# Patient Record
Sex: FEMALE | Race: WHITE | NOT HISPANIC OR LATINO | Employment: UNEMPLOYED | URBAN - METROPOLITAN AREA
[De-identification: names, ages, dates, MRNs, and addresses within clinical notes are randomized per-mention and may not be internally consistent; named-entity substitution may affect disease eponyms.]

---

## 2019-11-10 ENCOUNTER — HOSPITAL ENCOUNTER (EMERGENCY)
Facility: HOSPITAL | Age: 29
Discharge: HOME | End: 2019-11-10
Attending: EMERGENCY MEDICINE | Admitting: EMERGENCY MEDICINE

## 2019-11-10 ENCOUNTER — APPOINTMENT (OUTPATIENT)
Dept: CT IMAGING | Facility: HOSPITAL | Age: 29
End: 2019-11-10

## 2019-11-10 ENCOUNTER — APPOINTMENT (OUTPATIENT)
Dept: GENERAL RADIOLOGY | Facility: HOSPITAL | Age: 29
End: 2019-11-10

## 2019-11-10 VITALS
HEART RATE: 82 BPM | SYSTOLIC BLOOD PRESSURE: 91 MMHG | WEIGHT: 114 LBS | BODY MASS INDEX: 19.46 KG/M2 | HEIGHT: 64 IN | OXYGEN SATURATION: 95 % | DIASTOLIC BLOOD PRESSURE: 56 MMHG | RESPIRATION RATE: 16 BRPM | TEMPERATURE: 98.2 F

## 2019-11-10 DIAGNOSIS — R10.9 BILATERAL FLANK PAIN: ICD-10-CM

## 2019-11-10 DIAGNOSIS — N39.0 URINARY TRACT INFECTION IN FEMALE: Primary | ICD-10-CM

## 2019-11-10 LAB
ALBUMIN SERPL-MCNC: 4.4 G/DL (ref 3.5–5.2)
ALBUMIN/GLOB SERPL: 1.8 G/DL
ALP SERPL-CCNC: 78 U/L (ref 39–117)
ALT SERPL W P-5'-P-CCNC: 13 U/L (ref 1–33)
ANION GAP SERPL CALCULATED.3IONS-SCNC: 12.3 MMOL/L (ref 5–15)
AST SERPL-CCNC: 25 U/L (ref 1–32)
B-HCG UR QL: NEGATIVE
BACTERIA UR QL AUTO: ABNORMAL /HPF
BASOPHILS # BLD AUTO: 0.01 10*3/MM3 (ref 0–0.2)
BASOPHILS NFR BLD AUTO: 0.2 % (ref 0–1.5)
BILIRUB SERPL-MCNC: 0.2 MG/DL (ref 0.2–1.2)
BILIRUB UR QL STRIP: ABNORMAL
BUN BLD-MCNC: 10 MG/DL (ref 6–20)
BUN/CREAT SERPL: 10.1 (ref 7–25)
CALCIUM SPEC-SCNC: 9.2 MG/DL (ref 8.6–10.5)
CHLORIDE SERPL-SCNC: 98 MMOL/L (ref 98–107)
CLARITY UR: CLEAR
CO2 SERPL-SCNC: 23.7 MMOL/L (ref 22–29)
COLOR UR: YELLOW
CREAT BLD-MCNC: 0.99 MG/DL (ref 0.57–1)
D-LACTATE SERPL-SCNC: 1.1 MMOL/L (ref 0.5–2)
DEPRECATED RDW RBC AUTO: 43 FL (ref 37–54)
EOSINOPHIL # BLD AUTO: 0.31 10*3/MM3 (ref 0–0.4)
EOSINOPHIL NFR BLD AUTO: 7.1 % (ref 0.3–6.2)
ERYTHROCYTE [DISTWIDTH] IN BLOOD BY AUTOMATED COUNT: 13 % (ref 12.3–15.4)
FLUAV AG NPH QL: NEGATIVE
FLUBV AG NPH QL IA: NEGATIVE
GFR SERPL CREATININE-BSD FRML MDRD: 66 ML/MIN/1.73
GLOBULIN UR ELPH-MCNC: 2.5 GM/DL
GLUCOSE BLD-MCNC: 102 MG/DL (ref 65–99)
GLUCOSE UR STRIP-MCNC: NEGATIVE MG/DL
HCT VFR BLD AUTO: 39.5 % (ref 34–46.6)
HGB BLD-MCNC: 13.1 G/DL (ref 12–15.9)
HGB UR QL STRIP.AUTO: NEGATIVE
HYALINE CASTS UR QL AUTO: ABNORMAL /LPF
IMM GRANULOCYTES # BLD AUTO: 0.03 10*3/MM3 (ref 0–0.05)
IMM GRANULOCYTES NFR BLD AUTO: 0.7 % (ref 0–0.5)
KETONES UR QL STRIP: ABNORMAL
LEUKOCYTE ESTERASE UR QL STRIP.AUTO: NEGATIVE
LYMPHOCYTES # BLD AUTO: 0.49 10*3/MM3 (ref 0.7–3.1)
LYMPHOCYTES NFR BLD AUTO: 11.2 % (ref 19.6–45.3)
MCH RBC QN AUTO: 29.8 PG (ref 26.6–33)
MCHC RBC AUTO-ENTMCNC: 33.2 G/DL (ref 31.5–35.7)
MCV RBC AUTO: 89.8 FL (ref 79–97)
MONOCYTES # BLD AUTO: 0.49 10*3/MM3 (ref 0.1–0.9)
MONOCYTES NFR BLD AUTO: 11.2 % (ref 5–12)
NEUTROPHILS # BLD AUTO: 3.05 10*3/MM3 (ref 1.7–7)
NEUTROPHILS NFR BLD AUTO: 69.6 % (ref 42.7–76)
NITRITE UR QL STRIP: NEGATIVE
NRBC BLD AUTO-RTO: 0 /100 WBC (ref 0–0.2)
PH UR STRIP.AUTO: 6.5 [PH] (ref 4.5–8)
PLATELET # BLD AUTO: 230 10*3/MM3 (ref 140–450)
PMV BLD AUTO: 9.3 FL (ref 6–12)
POTASSIUM BLD-SCNC: 4.3 MMOL/L (ref 3.5–5.2)
PROT SERPL-MCNC: 6.9 G/DL (ref 6–8.5)
PROT UR QL STRIP: ABNORMAL
RBC # BLD AUTO: 4.4 10*6/MM3 (ref 3.77–5.28)
RBC # UR: ABNORMAL /HPF
REF LAB TEST METHOD: ABNORMAL
SODIUM BLD-SCNC: 134 MMOL/L (ref 136–145)
SP GR UR STRIP: 1.03 (ref 1–1.03)
SQUAMOUS #/AREA URNS HPF: ABNORMAL /HPF
UROBILINOGEN UR QL STRIP: ABNORMAL
WBC NRBC COR # BLD: 4.38 10*3/MM3 (ref 3.4–10.8)
WBC UR QL AUTO: ABNORMAL /HPF

## 2019-11-10 PROCEDURE — 99284 EMERGENCY DEPT VISIT MOD MDM: CPT | Performed by: PHYSICIAN ASSISTANT

## 2019-11-10 PROCEDURE — 99283 EMERGENCY DEPT VISIT LOW MDM: CPT

## 2019-11-10 PROCEDURE — 85025 COMPLETE CBC W/AUTO DIFF WBC: CPT | Performed by: PHYSICIAN ASSISTANT

## 2019-11-10 PROCEDURE — 87086 URINE CULTURE/COLONY COUNT: CPT | Performed by: PHYSICIAN ASSISTANT

## 2019-11-10 PROCEDURE — 87040 BLOOD CULTURE FOR BACTERIA: CPT | Performed by: PHYSICIAN ASSISTANT

## 2019-11-10 PROCEDURE — 96365 THER/PROPH/DIAG IV INF INIT: CPT

## 2019-11-10 PROCEDURE — 71046 X-RAY EXAM CHEST 2 VIEWS: CPT

## 2019-11-10 PROCEDURE — 83605 ASSAY OF LACTIC ACID: CPT | Performed by: PHYSICIAN ASSISTANT

## 2019-11-10 PROCEDURE — 87491 CHLMYD TRACH DNA AMP PROBE: CPT | Performed by: PHYSICIAN ASSISTANT

## 2019-11-10 PROCEDURE — 87591 N.GONORRHOEAE DNA AMP PROB: CPT | Performed by: PHYSICIAN ASSISTANT

## 2019-11-10 PROCEDURE — 0 IOPAMIDOL PER 1 ML: Performed by: EMERGENCY MEDICINE

## 2019-11-10 PROCEDURE — 36415 COLL VENOUS BLD VENIPUNCTURE: CPT

## 2019-11-10 PROCEDURE — 74177 CT ABD & PELVIS W/CONTRAST: CPT

## 2019-11-10 PROCEDURE — 81025 URINE PREGNANCY TEST: CPT | Performed by: PHYSICIAN ASSISTANT

## 2019-11-10 PROCEDURE — 87804 INFLUENZA ASSAY W/OPTIC: CPT | Performed by: PHYSICIAN ASSISTANT

## 2019-11-10 PROCEDURE — 25010000002 KETOROLAC TROMETHAMINE PER 15 MG: Performed by: PHYSICIAN ASSISTANT

## 2019-11-10 PROCEDURE — 80053 COMPREHEN METABOLIC PANEL: CPT | Performed by: PHYSICIAN ASSISTANT

## 2019-11-10 PROCEDURE — 96361 HYDRATE IV INFUSION ADD-ON: CPT

## 2019-11-10 PROCEDURE — 96375 TX/PRO/DX INJ NEW DRUG ADDON: CPT

## 2019-11-10 PROCEDURE — 25010000002 CEFTRIAXONE SODIUM-DEXTROSE 1-3.74 GM-%(50ML) RECONSTITUTED SOLUTION: Performed by: PHYSICIAN ASSISTANT

## 2019-11-10 PROCEDURE — 81001 URINALYSIS AUTO W/SCOPE: CPT | Performed by: PHYSICIAN ASSISTANT

## 2019-11-10 RX ORDER — IBUPROFEN 400 MG/1
400 TABLET ORAL EVERY 6 HOURS PRN
COMMUNITY
End: 2021-01-04

## 2019-11-10 RX ORDER — PAROXETINE 30 MG/1
30 TABLET, FILM COATED ORAL EVERY MORNING
COMMUNITY
End: 2020-12-29

## 2019-11-10 RX ORDER — CEFUROXIME AXETIL 500 MG/1
500 TABLET ORAL 2 TIMES DAILY
Qty: 14 TABLET | Refills: 0 | Status: SHIPPED | OUTPATIENT
Start: 2019-11-10 | End: 2019-11-17

## 2019-11-10 RX ORDER — ACETAMINOPHEN 500 MG
1000 TABLET ORAL ONCE
Status: COMPLETED | OUTPATIENT
Start: 2019-11-10 | End: 2019-11-10

## 2019-11-10 RX ORDER — KETOROLAC TROMETHAMINE 30 MG/ML
30 INJECTION, SOLUTION INTRAMUSCULAR; INTRAVENOUS ONCE
Status: COMPLETED | OUTPATIENT
Start: 2019-11-10 | End: 2019-11-10

## 2019-11-10 RX ORDER — CEFTRIAXONE 1 G/50ML
1 INJECTION, SOLUTION INTRAVENOUS ONCE
Status: COMPLETED | OUTPATIENT
Start: 2019-11-10 | End: 2019-11-10

## 2019-11-10 RX ORDER — DEXTROAMPHETAMINE SACCHARATE, AMPHETAMINE ASPARTATE, DEXTROAMPHETAMINE SULFATE AND AMPHETAMINE SULFATE 5; 5; 5; 5 MG/1; MG/1; MG/1; MG/1
40 TABLET ORAL DAILY
COMMUNITY
End: 2021-01-04

## 2019-11-10 RX ORDER — SULFAMETHOXAZOLE AND TRIMETHOPRIM 800; 160 MG/1; MG/1
1 TABLET ORAL 2 TIMES DAILY
COMMUNITY
End: 2019-11-10

## 2019-11-10 RX ORDER — AZITHROMYCIN 250 MG/1
1000 TABLET, FILM COATED ORAL ONCE
Status: COMPLETED | OUTPATIENT
Start: 2019-11-10 | End: 2019-11-10

## 2019-11-10 RX ORDER — SODIUM CHLORIDE 9 MG/ML
INJECTION, SOLUTION INTRAVENOUS
Status: DISCONTINUED
Start: 2019-11-10 | End: 2019-11-10 | Stop reason: HOSPADM

## 2019-11-10 RX ADMIN — CEFTRIAXONE 1 G: 1 INJECTION, SOLUTION INTRAVENOUS at 14:26

## 2019-11-10 RX ADMIN — ACETAMINOPHEN 1000 MG: 500 TABLET, FILM COATED ORAL at 13:08

## 2019-11-10 RX ADMIN — KETOROLAC TROMETHAMINE 30 MG: 30 INJECTION, SOLUTION INTRAMUSCULAR; INTRAVENOUS at 14:20

## 2019-11-10 RX ADMIN — SODIUM CHLORIDE 1000 ML: 9 INJECTION, SOLUTION INTRAVENOUS at 13:05

## 2019-11-10 RX ADMIN — IOPAMIDOL 100 ML: 755 INJECTION, SOLUTION INTRAVENOUS at 14:06

## 2019-11-10 RX ADMIN — AZITHROMYCIN 1000 MG: 250 TABLET, FILM COATED ORAL at 15:20

## 2019-11-10 NOTE — ED PROVIDER NOTES
" EMERGENCY DEPARTMENT ENCOUNTER      Room Number: 6/06    History is provided by the patient, no translation services needed    HPI:    Chief complaint: Body aches, flank pain, abdominal pain, dysuria    Location: Generalized body aches, bilateral flank pain, and right-sided abdominal pain    Quality/Severity: Moderate to severe, \"feels like I have been hit by a truck\"    Timing/Duration: Patient states she began having body aches a week ago, diagnosed with UTI 5 days ago, started on Bactrim, chills and bilateral flank pain began yesterday, pain in right lower quadrant of abdomen today.    Modifying Factors: Took Motrin hour prior to arrival today    Associated Symptoms: Positive for body aches, flank pain, abdominal pain, dysuria.  Denies any fevers, vomiting, diarrhea, syncope, hematuria, vaginal discharge, or vaginal bleeding.    Narrative: Pt is a 29 y.o. female who presents complaining of the above complaints.  She states she was diagnosed with a UTI at urgent care 5 days ago, has been on Bactrim since, she states she has been having chills and body aches, as well as flank pain and abdominal pain that began yesterday.  Patient reports she is healthy with no past medical history.  She states she is also still having dysuria, and difficulty with urination.  She also reports she becomes dizzy and short of air when she gets up to ambulate.  She denies any cough.      PMD: Provider, No Known    REVIEW OF SYSTEMS  Review of Systems   Constitutional: Positive for chills and fatigue. Negative for appetite change and fever.   Respiratory: Positive for shortness of breath. Negative for cough.    Cardiovascular: Negative for chest pain and palpitations.   Gastrointestinal: Positive for abdominal pain and nausea. Negative for constipation, diarrhea and vomiting.   Genitourinary: Positive for dysuria, flank pain and urgency. Negative for vaginal discharge and vaginal pain.   Musculoskeletal: Positive for myalgias. Negative " for arthralgias, neck pain and neck stiffness.   Skin: Negative for rash and wound.   Neurological: Positive for dizziness. Negative for syncope and headaches.   Psychiatric/Behavioral: Negative for confusion. The patient is not nervous/anxious.          PAST MEDICAL HISTORY  Active Ambulatory Problems     Diagnosis Date Noted   • No Active Ambulatory Problems     Resolved Ambulatory Problems     Diagnosis Date Noted   • No Resolved Ambulatory Problems     Past Medical History:   Diagnosis Date   • ADD (attention deficit disorder)    • Anxiety    • UTI (urinary tract infection)        PAST SURGICAL HISTORY  Past Surgical History:   Procedure Laterality Date   • BREAST SURGERY      IMPLANTS       FAMILY HISTORY  History reviewed. No pertinent family history.    SOCIAL HISTORY  Social History     Socioeconomic History   • Marital status: Single     Spouse name: Not on file   • Number of children: Not on file   • Years of education: Not on file   • Highest education level: Not on file   Tobacco Use   • Smoking status: Never Smoker   Substance and Sexual Activity   • Alcohol use: No     Frequency: Never   • Drug use: No   • Sexual activity: Yes     Partners: Male     Birth control/protection: Condom       ALLERGIES  Lyrica [pregabalin]      Current Facility-Administered Medications:   •  sodium chloride 0.9 % infusion  - ADS Override Pull, , , ,     Current Outpatient Medications:   •  amphetamine-dextroamphetamine (ADDERALL) 20 MG tablet, Take 40 mg by mouth Daily., Disp: , Rfl:   •  ibuprofen (ADVIL,MOTRIN) 400 MG tablet, Take 400 mg by mouth Every 6 (Six) Hours As Needed for Mild Pain ., Disp: , Rfl:   •  PARoxetine (PAXIL) 30 MG tablet, Take 30 mg by mouth Every Morning., Disp: , Rfl:   •  cefuroxime (CEFTIN) 500 MG tablet, Take 1 tablet by mouth 2 (Two) Times a Day for 7 days., Disp: 14 tablet, Rfl: 0    PHYSICAL EXAM  ED Triage Vitals [11/10/19 1222]   Temp Heart Rate Resp BP SpO2   99 °F (37.2 °C) 92 16 106/63 93  %      Temp src Heart Rate Source Patient Position BP Location FiO2 (%)   -- -- -- -- --       Physical Exam   Constitutional: She is oriented to person, place, and time and well-developed, well-nourished, and in no distress. She has a sickly appearance.   Cardiovascular: Normal rate, regular rhythm, normal heart sounds and intact distal pulses.   Pulmonary/Chest: Effort normal and breath sounds normal.   Abdominal: Soft. Bowel sounds are normal. There is tenderness. There is guarding and CVA tenderness (Bilateral). There is no rigidity and no rebound.   Musculoskeletal: Normal range of motion. She exhibits no edema.   Neurological: She is alert and oriented to person, place, and time.   Psychiatric: Memory, affect and judgment normal. Her mood appears anxious.         LAB RESULTS  Results for orders placed or performed during the hospital encounter of 11/10/19   Influenza Antigen, Rapid - Swab, Nasopharynx   Result Value Ref Range    Influenza A Ag, EIA Negative Negative    Influenza B Ag, EIA Negative Negative   Comprehensive Metabolic Panel   Result Value Ref Range    Glucose 102 (H) 65 - 99 mg/dL    BUN 10 6 - 20 mg/dL    Creatinine 0.99 0.57 - 1.00 mg/dL    Sodium 134 (L) 136 - 145 mmol/L    Potassium 4.3 3.5 - 5.2 mmol/L    Chloride 98 98 - 107 mmol/L    CO2 23.7 22.0 - 29.0 mmol/L    Calcium 9.2 8.6 - 10.5 mg/dL    Total Protein 6.9 6.0 - 8.5 g/dL    Albumin 4.40 3.50 - 5.20 g/dL    ALT (SGPT) 13 1 - 33 U/L    AST (SGOT) 25 1 - 32 U/L    Alkaline Phosphatase 78 39 - 117 U/L    Total Bilirubin 0.2 0.2 - 1.2 mg/dL    eGFR Non African Amer 66 >60 mL/min/1.73    Globulin 2.5 gm/dL    A/G Ratio 1.8 g/dL    BUN/Creatinine Ratio 10.1 7.0 - 25.0    Anion Gap 12.3 5.0 - 15.0 mmol/L   Urinalysis With Microscopic If Indicated (No Culture) - Urine, Clean Catch   Result Value Ref Range    Color, UA Yellow Yellow, Straw    Appearance, UA Clear Clear    pH, UA 6.5 4.5 - 8.0    Specific Gravity, UA 1.026 1.003 - 1.030     Glucose, UA Negative Negative    Ketones, UA 40 mg/dL (2+) (A) Negative    Bilirubin, UA Small (1+) (A) Negative    Blood, UA Negative Negative    Protein, UA 30 mg/dL (1+) (A) Negative    Leuk Esterase, UA Negative Negative    Nitrite, UA Negative Negative    Urobilinogen, UA 1.0 E.U./dL 0.2 - 1.0 E.U./dL   Pregnancy, Urine - Urine, Clean Catch   Result Value Ref Range    HCG, Urine QL Negative Negative   CBC Auto Differential   Result Value Ref Range    WBC 4.38 3.40 - 10.80 10*3/mm3    RBC 4.40 3.77 - 5.28 10*6/mm3    Hemoglobin 13.1 12.0 - 15.9 g/dL    Hematocrit 39.5 34.0 - 46.6 %    MCV 89.8 79.0 - 97.0 fL    MCH 29.8 26.6 - 33.0 pg    MCHC 33.2 31.5 - 35.7 g/dL    RDW 13.0 12.3 - 15.4 %    RDW-SD 43.0 37.0 - 54.0 fl    MPV 9.3 6.0 - 12.0 fL    Platelets 230 140 - 450 10*3/mm3    Neutrophil % 69.6 42.7 - 76.0 %    Lymphocyte % 11.2 (L) 19.6 - 45.3 %    Monocyte % 11.2 5.0 - 12.0 %    Eosinophil % 7.1 (H) 0.3 - 6.2 %    Basophil % 0.2 0.0 - 1.5 %    Immature Grans % 0.7 (H) 0.0 - 0.5 %    Neutrophils, Absolute 3.05 1.70 - 7.00 10*3/mm3    Lymphocytes, Absolute 0.49 (L) 0.70 - 3.10 10*3/mm3    Monocytes, Absolute 0.49 0.10 - 0.90 10*3/mm3    Eosinophils, Absolute 0.31 0.00 - 0.40 10*3/mm3    Basophils, Absolute 0.01 0.00 - 0.20 10*3/mm3    Immature Grans, Absolute 0.03 0.00 - 0.05 10*3/mm3    nRBC 0.0 0.0 - 0.2 /100 WBC   Lactic Acid, Plasma   Result Value Ref Range    Lactate 1.1 0.5 - 2.0 mmol/L   Urinalysis, Microscopic Only - Urine, Clean Catch   Result Value Ref Range    RBC, UA 0-2 (A) None Seen /HPF    WBC, UA 3-5 (A) None Seen /HPF    Bacteria, UA 3+ (A) None Seen /HPF    Squamous Epithelial Cells, UA 7-12 (A) None Seen, 0-2 /HPF    Hyaline Casts, UA None Seen None Seen /LPF    Methodology Manual Light Microscopy          I ordered the above labs and reviewed the results    RADIOLOGY  Xr Chest 2 View    Result Date: 11/10/2019  Narrative: CR Chest 2 Vws INDICATION:  Exertional dyspnea chills body aches  COMPARISON:  Chest x-ray January 25th 2013 FINDINGS: PA and lateral views of the chest.  No pleural effusion. Cardiac silhouette size is normal. No acute-appearing parenchymal infiltrate or acute congestive failure. No pneumothorax. Contrast is seen in the left renal collecting system from earlier CT scan.     Impression: No acute cardiopulmonary findings. Signer Name: Angie Villarreal MD  Signed: 11/10/2019 2:06 PM  Workstation Name: [email or web address]  [facility]    Ct Abdomen Pelvis With Contrast    Result Date: 11/10/2019  Narrative: INDICATION: Bilateral flank pain down the right lower quadrant with guarding. Nausea. 6 for 8 days worsening yesterday and today. TECHNIQUE: CT of the abdomen and pelvis during the intravenous administration nonionic contrast media. Dose recorded in the patient's chart contrast. Coronal and sagittal reconstructions were obtained.  Radiation dose reduction techniques included automated exposure control or exposure modulation based on body size. Radiation audit for number of CT and nuclear cardiology exams performed in the last year: 0.  COMPARISON: None available. FINDINGS: Lung bases: Clear Abdomen: The liver, gallbladder, spleen, kidneys, adrenal glands, and pancreas are normal. There is no abdominal aortic aneurysm. There is no retroperitoneal lymphadenopathy. There is a moderate amount of colonic stool to the level of the rectum. I cannot identify a normal-appearing appendix. Cannot exclude the diagnosis of appendicitis. There is a small amount of free fluid dependently in the pelvis which could be physiologic in the patient's age group and there are a few prominent loops of small bowel with air-fluid levels in the pelvis which are very nonspecific. There is nothing to suggest bowel obstruction. Pelvis: There is no free air. There is no percutaneously drainable fluid collection appreciated. The lack of oral contrast media does somewhat limit this study and the  relatively thin patient. Uterus is unremarkable. The adnexal structures are difficult to differentiate between the adjacent unopacified bowel loops.     Impression: 1. There is no evidence for bowel obstruction. There is a moderate amount of colonic gas and stool to the level the rectum. I cannot identify the appendix. This study therefore does not exclude appendicitis. 2. Small amount of free fluid in the pelvis is likely physiologic given patient age group. Signer Name: Angie Villarreal MD  Signed: 11/10/2019 2:17 PM  Workstation Name: [email or web address]  [facility]      I ordered the above radiologic testing and reviewed the results    PROCEDURES  Procedures      PROGRESS AND CONSULTS  ED Course as of Nov 10 1631   Sun Nov 10, 2019   1245 Upon initial assessment, patient does appear ill, her heart rate increased to 124 when she sat during physical exam, we will proceed with septic work-up and start fluids.  [KS]   1444 Discussed case with FEDE Medellin at [facility] where patient was treated for UTI 5 days ago. aLcie states patient had no growth on her urine culture.  [KS]   1504 Patient received 1 g of Rocephin IV here, as well as a 1 L bolus, 1 g of Tylenol, and 30 mg of Toradol IV.  I discussed that we will repeat her urine culture, and change her antibiotics from Bactrim to Ceftin.  [KS]   1505 Patient states her pain is minimal now after Toradol.  I discussed the results with her CT with her, and that we could not definitively rule out appendicitis, however with her pain improving after Toradol and normal lab work I have little concern clinically for appendicitis at this time.  I discussed with patient that I spoke with her provider from the [facility], and they informed me that there was no growth in her urine culture.  She does still have a significant amount of bacteria present in her urine, however there are only a few white blood cells present.  I inquired given the  negative growth, and bacteriuria if she had any suspicion for STDs whatsoever.  Patient states she has never been tested for STDs, however she is agreeable with receiving a dose of azithromycin here, and aptima testing.  Patient declines a pelvic exam.  She denies any vaginal discharge, I discussed with her that given the pain in her lower abdomen, this could be PID, however if I am unable to perform a pelvic exam it is difficult to establish this diagnosis.  Patient reports she is feeling better overall, and I have discussed that she may alternate Tylenol and Motrin for chills and body aches.  Her vitals have improved, and her heart rate is 82.  She states that her systolic blood pressure is typically low in the 90s.  I have provided her with follow-up information for OB/GYN, as well as primary care providers in the area.  I discussed that it is imperative that she follow-up, and she will be notified of any abnormal results with further testing.  I discussed if she has any worsening signs or symptoms she should return to the emergency department.  Patient is agreeable with plan and verbalizes understanding.  [KS]      ED Course User Index  [KS] Margie Wyatt PA-C           MEDICAL DECISION MAKING  Results were reviewed/discussed with the patient and they were also made aware of online access. Pt also made aware that some labs, such as cultures, will not be resulted during ER visit and follow up with PMD is necessary.     MDM     My differential diagnosis for abdominal pain includes but is not limited to:  Gastritis, gastroenteritis, peptic ulcer disease, GERD, esophageal perforation, acute appendicitis, mesenteric adenitis, Meckel’s diverticulum, epiploic appendagitis, diverticulitis, colon cancer, ulcerative colitis, Crohn’s disease, intussusception, small bowel obstruction, adhesions, ischemic bowel, perforated viscus, ileus, obstipation, biliary colic, cholecystitis, cholelithiasis, Zach-Andres Baljeet,  hepatitis, pancreatitis, common bile duct obstruction, cholangitis, bile leak, splenic trauma, splenic rupture, splenic infarction, splenic abscess, abdominal abscess, ascites, spontaneous bacterial peritonitis, hernia, UTI, cystitis,ureterolithiasis, urinary obstruction, ovarian cyst, torsion, pregnancy, ectopic pregnancy, PID, pelvic abscess, mittelschmerz, endometriosis, AAA, myocardial infarction, pneumonia, cancer, porphyria, DKA, medications, sickle cell, viral syndrome, zoster      DIAGNOSIS  Final diagnoses:   Urinary tract infection in female   Bilateral flank pain       Latest Documented Vital Signs:  As of 4:31 PM  BP- 91/56 HR- 82 Temp- 98.2 °F (36.8 °C) (Oral) O2 sat- 95%    DISPOSITION  Patient discharged home in care of her fiancé with instructions to follow-up with a primary care provider and OB/GYN.    Discussed pertinent labs and imaging findings with the patient/family.  Patient/Family voiced understanding of need to follow-up for recheck, further testing as needed.  Return to the emergency Department warnings were given.         Medication List      New Prescriptions    cefuroxime 500 MG tablet  Commonly known as:  CEFTIN  Take 1 tablet by mouth 2 (Two) Times a Day for 7 days.        Stop    sulfamethoxazole-trimethoprim 800-160 MG per tablet  Commonly known as:  BACTRIM DS,SEPTRA DS              Follow-up Information     Provider, No Known. Call in 1 day.    Why:  To schedule follow-up appointment  Contact information:  [facility] [address] [phone]             Rylee Bhandari, APRN. Call in 3 days.    Specialty:  Obstetrics and Gynecology  Why:  To schedule follow-up appointment  Contact information:  [address] [address] [phone]                     Dictated utilizing Dragon dictation       Margie Wyatt PA-C  11/10/19 4980

## 2019-11-12 LAB
BACTERIA SPEC AEROBE CULT: ABNORMAL
SPECIMEN STATUS: NORMAL

## 2019-11-14 LAB
C TRACH RRNA SPEC DONR QL NAA+PROBE: NEGATIVE
N GONORRHOEA DNA SPEC QL NAA+PROBE: NEGATIVE

## 2019-11-15 LAB
BACTERIA SPEC AEROBE CULT: NORMAL
BACTERIA SPEC AEROBE CULT: NORMAL

## 2020-06-02 ENCOUNTER — OFFICE VISIT (OUTPATIENT)
Dept: OBSTETRICS AND GYNECOLOGY | Age: 30
End: 2020-06-02

## 2020-06-02 VITALS
BODY MASS INDEX: 18.44 KG/M2 | SYSTOLIC BLOOD PRESSURE: 100 MMHG | HEIGHT: 64 IN | WEIGHT: 108 LBS | DIASTOLIC BLOOD PRESSURE: 68 MMHG

## 2020-06-02 DIAGNOSIS — Z01.419 ENCOUNTER FOR GYNECOLOGICAL EXAMINATION: ICD-10-CM

## 2020-06-02 DIAGNOSIS — Z31.69 PRE-CONCEPTION COUNSELING: ICD-10-CM

## 2020-06-02 DIAGNOSIS — Z00.00 ENCOUNTER FOR ANNUAL PHYSICAL EXAM: Primary | ICD-10-CM

## 2020-06-02 LAB
ERYTHROCYTE [DISTWIDTH] IN BLOOD BY AUTOMATED COUNT: 12.6 % (ref 12.3–15.4)
HCT VFR BLD AUTO: 37.5 % (ref 34–46.6)
HGB BLD-MCNC: 12.5 G/DL (ref 12–15.9)
MCH RBC QN AUTO: 29.8 PG (ref 26.6–33)
MCHC RBC AUTO-ENTMCNC: 33.3 G/DL (ref 31.5–35.7)
MCV RBC AUTO: 89.5 FL (ref 79–97)
PLATELET # BLD AUTO: 301 10*3/MM3 (ref 140–450)
RBC # BLD AUTO: 4.19 10*6/MM3 (ref 3.77–5.28)
TSH SERPL DL<=0.005 MIU/L-ACNC: 2.53 UIU/ML (ref 0.27–4.2)
WBC # BLD AUTO: 7.34 10*3/MM3 (ref 3.4–10.8)

## 2020-06-02 PROCEDURE — 99385 PREV VISIT NEW AGE 18-39: CPT | Performed by: OBSTETRICS & GYNECOLOGY

## 2020-06-04 LAB
CYTOLOGIST CVX/VAG CYTO: NORMAL
CYTOLOGY CVX/VAG DOC CYTO: NORMAL
CYTOLOGY CVX/VAG DOC THIN PREP: NORMAL
DX ICD CODE: NORMAL
HIV 1 & 2 AB SER-IMP: NORMAL
HPV I/H RISK 4 DNA CVX QL PROBE+SIG AMP: NEGATIVE
OTHER STN SPEC: NORMAL
STAT OF ADQ CVX/VAG CYTO-IMP: NORMAL

## 2020-12-28 ENCOUNTER — OFFICE VISIT (OUTPATIENT)
Dept: OBSTETRICS AND GYNECOLOGY | Age: 30
End: 2020-12-28

## 2020-12-28 VITALS
SYSTOLIC BLOOD PRESSURE: 104 MMHG | BODY MASS INDEX: 19.46 KG/M2 | HEIGHT: 64 IN | WEIGHT: 114 LBS | DIASTOLIC BLOOD PRESSURE: 52 MMHG

## 2020-12-28 DIAGNOSIS — Z13.89 SCREENING FOR HEMATURIA OR PROTEINURIA: ICD-10-CM

## 2020-12-28 DIAGNOSIS — Z32.00 ENCOUNTER FOR CONFIRMATION OF PREGNANCY TEST RESULT WITH PHYSICAL EXAMINATION: Primary | ICD-10-CM

## 2020-12-28 LAB
B-HCG UR QL: POSITIVE
CLARITY, POC: CLEAR
COLOR UR: YELLOW
GLUCOSE UR STRIP-MCNC: NEGATIVE MG/DL
INTERNAL NEGATIVE CONTROL: NEGATIVE
INTERNAL POSITIVE CONTROL: POSITIVE
Lab: ABNORMAL
PROT UR STRIP-MCNC: NEGATIVE MG/DL

## 2020-12-28 PROCEDURE — 81025 URINE PREGNANCY TEST: CPT | Performed by: OBSTETRICS & GYNECOLOGY

## 2020-12-28 PROCEDURE — 81002 URINALYSIS NONAUTO W/O SCOPE: CPT | Performed by: OBSTETRICS & GYNECOLOGY

## 2020-12-28 PROCEDURE — 99213 OFFICE O/P EST LOW 20 MIN: CPT | Performed by: OBSTETRICS & GYNECOLOGY

## 2020-12-28 RX ORDER — FLUOXETINE HYDROCHLORIDE 20 MG/1
CAPSULE ORAL
COMMUNITY
Start: 2020-11-10 | End: 2021-01-04

## 2020-12-28 NOTE — PROGRESS NOTES
Subjective       History of Present Illness  Nadya Cisneros is a 30 y.o. female is being seen today for confirmation of pregnancy.  Ultrasounds's on the crown-rump length she is just 6 weeks 0 days but when you use a yolk sac at 6 weeks 3 days.  No cardiac Tibbett he.  Patient is symptomatic with nausea and increased appetite.  She has had no bleeding.  We will check hormone levels today  Chief Complaint   Patient presents with   • Follow-up     Preg conf, LMP 10/29/2020, US today   .        The following portions of the patient's history were reviewed and updated as appropriate: allergies, current medications, past family history, past medical history, past social history, past surgical history and problem list.    PAST MEDICAL HISTORY  Past Medical History:   Diagnosis Date   • ADD (attention deficit disorder)    • Anxiety    • UTI (urinary tract infection)      OB History    Para Term  AB Living   0             SAB TAB Ectopic Molar Multiple Live Births                   Past Surgical History:   Procedure Laterality Date   • BREAST SURGERY      IMPLANTS     History reviewed. No pertinent family history.  Social History     Tobacco Use   Smoking Status Never Smoker   Smokeless Tobacco Never Used       Current Outpatient Medications:   •  Docosahexaenoic Acid (PRENATAL DHA PO), Take 1 tablet by mouth Daily., Disp: , Rfl:   •  FLUoxetine (PROzac) 20 MG capsule, TK 1 C PO EVERY DAY STARTING 7 DAYS  BEFORE MENSTRUAL CYCLE UNTIL MENSTRUAL CYCLE ENDS, Disp: , Rfl:   •  amphetamine-dextroamphetamine (ADDERALL) 20 MG tablet, Take 40 mg by mouth Daily., Disp: , Rfl:   •  ibuprofen (ADVIL,MOTRIN) 400 MG tablet, Take 400 mg by mouth Every 6 (Six) Hours As Needed for Mild Pain ., Disp: , Rfl:   •  PARoxetine (PAXIL) 30 MG tablet, Take 30 mg by mouth Every Morning., Disp: , Rfl:     There is no immunization history on file for this patient.    Review of Systems       Except as outlined in history of physical  illness, patient denies any changes in her GYN, , GI systems. All other systems reviewed are negative.    Objective   Physical Exam   Alert and oriented, respirations unlabored, heart regular rate and rhythm   Pelvic see ultrasound      Assessment/Plan   Diagnoses and all orders for this visit:    1. Encounter for confirmation of pregnancy test result with physical examination (Primary)  -     POC Pregnancy, Urine  -     POC Urinalysis Dipstick  -     OB Panel With HIV  -     Progesterone  -     TSH  -     Urine Culture - Urine, Urine, Clean Catch  -     HCG, B-subunit, Quantitative    2. Screening for hematuria or proteinuria  -     POC Urinalysis Dipstick  -     OB Panel With HIV  -     Progesterone  -     TSH  -     Urine Culture - Urine, Urine, Clean Catch      See above discrepancy between her expected dates and ultrasound dates and crown-rump length versus including gestational sac.  Checking lab work to see repeat an ultrasound in 2 weeks if patient has any bleeding etc. she will call us.  Otherwise continue her prenatal vitamin 1 at a time           Orders Placed This Encounter   Procedures   • Urine Culture - Urine, Urine, Clean Catch   • OB Panel With HIV   • Progesterone   • TSH   • HCG, B-subunit, Quantitative   • POC Pregnancy, Urine   • POC Urinalysis Dipstick           EMR Dragon/ Transcription disclaimer:  Much of the encounter note is an electronic transcription/translation of spoken language to printed text. The electronic translation of spoken language may permit erroneous, or at times, nonessential words or phrases to be inadvertently transcribes; Although i have reviewed the note for such errors, some may still exist.

## 2020-12-29 ENCOUNTER — TELEPHONE (OUTPATIENT)
Dept: OBSTETRICS AND GYNECOLOGY | Age: 30
End: 2020-12-29

## 2020-12-29 DIAGNOSIS — Z32.00 ENCOUNTER FOR CONFIRMATION OF PREGNANCY TEST RESULT WITH PHYSICAL EXAMINATION: Primary | ICD-10-CM

## 2020-12-29 LAB
ABO GROUP BLD: NORMAL
BASOPHILS # BLD AUTO: 0 X10E3/UL (ref 0–0.2)
BASOPHILS NFR BLD AUTO: 1 %
BLD GP AB SCN SERPL QL: NEGATIVE
EOSINOPHIL # BLD AUTO: 0.1 X10E3/UL (ref 0–0.4)
EOSINOPHIL NFR BLD AUTO: 1 %
ERYTHROCYTE [DISTWIDTH] IN BLOOD BY AUTOMATED COUNT: 12.3 % (ref 11.7–15.4)
HBV SURFACE AG SERPL QL IA: NEGATIVE
HCG INTACT+B SERPL-ACNC: NORMAL MIU/ML
HCT VFR BLD AUTO: 35.7 % (ref 34–46.6)
HCV AB S/CO SERPL IA: <0.1 S/CO RATIO (ref 0–0.9)
HGB BLD-MCNC: 11.9 G/DL (ref 11.1–15.9)
HIV 1+2 AB+HIV1 P24 AG SERPL QL IA: NON REACTIVE
IMM GRANULOCYTES # BLD AUTO: 0 X10E3/UL (ref 0–0.1)
IMM GRANULOCYTES NFR BLD AUTO: 0 %
LYMPHOCYTES # BLD AUTO: 2.4 X10E3/UL (ref 0.7–3.1)
LYMPHOCYTES NFR BLD AUTO: 30 %
MCH RBC QN AUTO: 30.1 PG (ref 26.6–33)
MCHC RBC AUTO-ENTMCNC: 33.3 G/DL (ref 31.5–35.7)
MCV RBC AUTO: 90 FL (ref 79–97)
MONOCYTES # BLD AUTO: 0.5 X10E3/UL (ref 0.1–0.9)
MONOCYTES NFR BLD AUTO: 7 %
NEUTROPHILS # BLD AUTO: 4.9 X10E3/UL (ref 1.4–7)
NEUTROPHILS NFR BLD AUTO: 61 %
PLATELET # BLD AUTO: 285 X10E3/UL (ref 150–450)
PROGEST SERPL-MCNC: 7 NG/ML
RBC # BLD AUTO: 3.96 X10E6/UL (ref 3.77–5.28)
RH BLD: POSITIVE
RPR SER QL: NON REACTIVE
RUBV IGG SERPL IA-ACNC: 7.01 INDEX
TSH SERPL DL<=0.005 MIU/L-ACNC: 2.96 UIU/ML (ref 0.45–4.5)
WBC # BLD AUTO: 7.9 X10E3/UL (ref 3.4–10.8)

## 2020-12-29 NOTE — PROGRESS NOTES
I have discussed findings with patient, she is going to start Prometrium intravaginally each night, prescription has already been sent  She will also come in to recheck a beta hCG and a repeat progesterone tomorrow or Thursday.

## 2020-12-29 NOTE — TELEPHONE ENCOUNTER
Patient called, stated that you sent an  rx into her pharmacy pt would like that rx sent to    Pharm[facility] now updated ([facility])

## 2020-12-30 LAB
BACTERIA UR CULT: NO GROWTH
BACTERIA UR CULT: NORMAL

## 2020-12-31 ENCOUNTER — TELEPHONE (OUTPATIENT)
Dept: OBSTETRICS AND GYNECOLOGY | Age: 30
End: 2020-12-31

## 2020-12-31 LAB
HCG INTACT+B SERPL-ACNC: NORMAL MIU/ML
PROGEST SERPL-MCNC: 10.7 NG/ML

## 2020-12-31 NOTE — PROGRESS NOTES
I have discussed findings with patient, consistent with probable blighted ovum, she still feels pregnant has had no bleeding, she would like to repeat an ultrasound early next week to confirm.  That is in the process of scheduling.  Miscarriage warnings given.

## 2021-01-04 ENCOUNTER — OFFICE VISIT (OUTPATIENT)
Dept: OBSTETRICS AND GYNECOLOGY | Age: 31
End: 2021-01-04

## 2021-01-04 ENCOUNTER — TELEPHONE (OUTPATIENT)
Dept: OBSTETRICS AND GYNECOLOGY | Age: 31
End: 2021-01-04

## 2021-01-04 VITALS
WEIGHT: 113 LBS | DIASTOLIC BLOOD PRESSURE: 60 MMHG | SYSTOLIC BLOOD PRESSURE: 102 MMHG | BODY MASS INDEX: 19.29 KG/M2 | HEIGHT: 64 IN

## 2021-01-04 DIAGNOSIS — O03.2 INCOMPLETE MISCARRIAGE WITH BLOOD CLOT: Primary | ICD-10-CM

## 2021-01-04 PROCEDURE — 99213 OFFICE O/P EST LOW 20 MIN: CPT | Performed by: OBSTETRICS & GYNECOLOGY

## 2021-01-04 RX ORDER — OXYCODONE HYDROCHLORIDE AND ACETAMINOPHEN 5; 325 MG/1; MG/1
1 TABLET ORAL EVERY 4 HOURS PRN
Qty: 6 TABLET | Refills: 0 | Status: SHIPPED | OUTPATIENT
Start: 2021-01-04 | End: 2021-04-13

## 2021-01-04 NOTE — TELEPHONE ENCOUNTER
Spoke with Kodi ().  He will bring Nayda into the office.  Heading this way now.   Per Yolis, just double book a spot to get her in.  Placed her @ 10:30 am.

## 2021-01-04 NOTE — PROGRESS NOTES
Subjective       History of Present Illness  Nadya Cisneros is a 30 y.o. female is being seen today for continued vaginal bleeding in first trimester pregnancy and suspected incomplete miscarriage.  Patient had an early ultrasound that was not consistent with dates, hormone levels were lower than anticipated.  She was scheduled for follow-up however bleeding picked up including clots and significant cramping and was worked in this morning she is known to be Rh+  Chief Complaint   Patient presents with   • Threatened Miscarriage     Bleeding and cramping began last night.    .        The following portions of the patient's history were reviewed and updated as appropriate: allergies, current medications, past family history, past medical history, past social history, past surgical history and problem list.    PAST MEDICAL HISTORY  Past Medical History:   Diagnosis Date   • ADD (attention deficit disorder)    • Anxiety    • UTI (urinary tract infection)      OB History    Para Term  AB Living   0             SAB TAB Ectopic Molar Multiple Live Births                   Past Surgical History:   Procedure Laterality Date   • BREAST SURGERY      IMPLANTS     No family history on file.  Social History     Tobacco Use   Smoking Status Never Smoker   Smokeless Tobacco Never Used       Current Outpatient Medications:   •  Docosahexaenoic Acid (PRENATAL DHA PO), Take 1 tablet by mouth Daily., Disp: , Rfl:     There is no immunization history on file for this patient.    Review of Systems       Except as outlined in history of physical illness, patient denies any changes in her GYN, , GI systems. All other systems reviewed are negative.    Objective   Physical Exam   Alert and oriented, respirations unlabored, heart regular rate and rhythm   Pelvic external genitalia normal vagina shows blood clot cervix is not open, uterus is slightly enlarged at 5 weeks size, adnexa nonenlarged nontender rectal exams  normal      Assessment/Plan   Diagnoses and all orders for this visit:    1. Incomplete miscarriage with blood clot (Primary)    As outlined above, patient is Rh+, likely in the process of miscarriage, will check an ultrasound today to see where we are in that process.  Options of treatment reviewed.    Addendum-ultrasound shows 5-week intrauterine pregnancy collapsing, consistent with miscarriage in progress.  Patient declines D&C, pain medicines called into her pharmacy.  Bleeding, dysmenorrhea and other warning signs given.  Patient will call to let us know how she is doing.  Otherwise will return in 5 to 6 weeks time for recheck and to discuss future pregnancies.         No orders of the defined types were placed in this encounter.          EMR Dragon/ Transcription disclaimer:  Much of the encounter note is an electronic transcription/translation of spoken language to printed text. The electronic translation of spoken language may permit erroneous, or at times, nonessential words or phrases to be inadvertently transcribes; Although i have reviewed the note for such errors, some may still exist.

## 2021-01-04 NOTE — TELEPHONE ENCOUNTER
(Link pt) Pt is about 7 weeks pregnant.  Pt is experiencing some mild bleeding & cramping.  Pt does have an appointment tomorrow with Dr. Davis @ 1:45.  Does she need to be seen today?    Please advise.    [phone]

## 2021-01-05 ENCOUNTER — HOSPITAL ENCOUNTER (EMERGENCY)
Facility: HOSPITAL | Age: 31
Discharge: HOME | End: 2021-01-05
Attending: EMERGENCY MEDICINE | Admitting: EMERGENCY MEDICINE

## 2021-01-05 VITALS
RESPIRATION RATE: 18 BRPM | DIASTOLIC BLOOD PRESSURE: 60 MMHG | WEIGHT: 119 LBS | HEIGHT: 63 IN | TEMPERATURE: 98.7 F | SYSTOLIC BLOOD PRESSURE: 120 MMHG | OXYGEN SATURATION: 100 % | BODY MASS INDEX: 21.09 KG/M2 | HEART RATE: 76 BPM

## 2021-01-05 DIAGNOSIS — R11.2 NON-INTRACTABLE VOMITING WITH NAUSEA, UNSPECIFIED VOMITING TYPE: ICD-10-CM

## 2021-01-05 DIAGNOSIS — O03.4 INCOMPLETE MISCARRIAGE: Primary | ICD-10-CM

## 2021-01-05 LAB
ALBUMIN SERPL-MCNC: 4.3 G/DL (ref 3.5–5.2)
ALBUMIN/GLOB SERPL: 1.4 G/DL
ALP SERPL-CCNC: 63 U/L (ref 39–117)
ALT SERPL W P-5'-P-CCNC: 13 U/L (ref 1–33)
ANION GAP SERPL CALCULATED.3IONS-SCNC: 9.1 MMOL/L (ref 5–15)
APTT PPP: 29.9 SECONDS (ref 24.3–38.1)
AST SERPL-CCNC: 18 U/L (ref 1–32)
BASOPHILS # BLD AUTO: 0.02 10*3/MM3 (ref 0–0.2)
BASOPHILS NFR BLD AUTO: 0.1 % (ref 0–1.5)
BILIRUB SERPL-MCNC: 0.4 MG/DL (ref 0–1.2)
BUN SERPL-MCNC: 10 MG/DL (ref 6–20)
BUN/CREAT SERPL: 13.5 (ref 7–25)
CALCIUM SPEC-SCNC: 9.2 MG/DL (ref 8.6–10.5)
CHLORIDE SERPL-SCNC: 101 MMOL/L (ref 98–107)
CO2 SERPL-SCNC: 24.9 MMOL/L (ref 22–29)
CREAT SERPL-MCNC: 0.74 MG/DL (ref 0.57–1)
DEPRECATED RDW RBC AUTO: 43.5 FL (ref 37–54)
EOSINOPHIL # BLD AUTO: 0.07 10*3/MM3 (ref 0–0.4)
EOSINOPHIL NFR BLD AUTO: 0.4 % (ref 0.3–6.2)
ERYTHROCYTE [DISTWIDTH] IN BLOOD BY AUTOMATED COUNT: 12.9 % (ref 12.3–15.4)
GFR SERPL CREATININE-BSD FRML MDRD: 92 ML/MIN/1.73
GLOBULIN UR ELPH-MCNC: 3.1 GM/DL
GLUCOSE SERPL-MCNC: 104 MG/DL (ref 65–99)
HCG INTACT+B SERPL-ACNC: 1439 MIU/ML
HCT VFR BLD AUTO: 35.2 % (ref 34–46.6)
HGB BLD-MCNC: 11.5 G/DL (ref 12–15.9)
IMM GRANULOCYTES # BLD AUTO: 0.04 10*3/MM3 (ref 0–0.05)
IMM GRANULOCYTES NFR BLD AUTO: 0.2 % (ref 0–0.5)
INR PPP: 1.07 (ref 0.9–1.1)
LYMPHOCYTES # BLD AUTO: 1.71 10*3/MM3 (ref 0.7–3.1)
LYMPHOCYTES NFR BLD AUTO: 10.1 % (ref 19.6–45.3)
MCH RBC QN AUTO: 29.9 PG (ref 26.6–33)
MCHC RBC AUTO-ENTMCNC: 32.7 G/DL (ref 31.5–35.7)
MCV RBC AUTO: 91.4 FL (ref 79–97)
MONOCYTES # BLD AUTO: 1.07 10*3/MM3 (ref 0.1–0.9)
MONOCYTES NFR BLD AUTO: 6.3 % (ref 5–12)
NEUTROPHILS NFR BLD AUTO: 14.02 10*3/MM3 (ref 1.7–7)
NEUTROPHILS NFR BLD AUTO: 82.9 % (ref 42.7–76)
PLATELET # BLD AUTO: 246 10*3/MM3 (ref 140–450)
PMV BLD AUTO: 9.8 FL (ref 6–12)
POTASSIUM SERPL-SCNC: 4.3 MMOL/L (ref 3.5–5.2)
PROT SERPL-MCNC: 7.4 G/DL (ref 6–8.5)
PROTHROMBIN TIME: 13.6 SECONDS (ref 12.1–15)
RBC # BLD AUTO: 3.85 10*6/MM3 (ref 3.77–5.28)
SODIUM SERPL-SCNC: 135 MMOL/L (ref 136–145)
WBC # BLD AUTO: 16.93 10*3/MM3 (ref 3.4–10.8)

## 2021-01-05 PROCEDURE — 96376 TX/PRO/DX INJ SAME DRUG ADON: CPT

## 2021-01-05 PROCEDURE — 99283 EMERGENCY DEPT VISIT LOW MDM: CPT

## 2021-01-05 PROCEDURE — 96374 THER/PROPH/DIAG INJ IV PUSH: CPT

## 2021-01-05 PROCEDURE — 85730 THROMBOPLASTIN TIME PARTIAL: CPT | Performed by: EMERGENCY MEDICINE

## 2021-01-05 PROCEDURE — 99283 EMERGENCY DEPT VISIT LOW MDM: CPT | Performed by: EMERGENCY MEDICINE

## 2021-01-05 PROCEDURE — 80053 COMPREHEN METABOLIC PANEL: CPT | Performed by: EMERGENCY MEDICINE

## 2021-01-05 PROCEDURE — 25010000002 MORPHINE PER 10 MG: Performed by: EMERGENCY MEDICINE

## 2021-01-05 PROCEDURE — 25010000002 HYDROMORPHONE PER 4 MG: Performed by: EMERGENCY MEDICINE

## 2021-01-05 PROCEDURE — 36415 COLL VENOUS BLD VENIPUNCTURE: CPT

## 2021-01-05 PROCEDURE — 96375 TX/PRO/DX INJ NEW DRUG ADDON: CPT

## 2021-01-05 PROCEDURE — 85025 COMPLETE CBC W/AUTO DIFF WBC: CPT | Performed by: EMERGENCY MEDICINE

## 2021-01-05 PROCEDURE — 85610 PROTHROMBIN TIME: CPT | Performed by: EMERGENCY MEDICINE

## 2021-01-05 PROCEDURE — 84702 CHORIONIC GONADOTROPIN TEST: CPT | Performed by: EMERGENCY MEDICINE

## 2021-01-05 PROCEDURE — 25010000002 ONDANSETRON PER 1 MG: Performed by: EMERGENCY MEDICINE

## 2021-01-05 RX ORDER — ONDANSETRON 2 MG/ML
4 INJECTION INTRAMUSCULAR; INTRAVENOUS ONCE
Status: COMPLETED | OUTPATIENT
Start: 2021-01-05 | End: 2021-01-05

## 2021-01-05 RX ORDER — ONDANSETRON 4 MG/1
4 TABLET, ORALLY DISINTEGRATING ORAL EVERY 4 HOURS PRN
Qty: 15 TABLET | Refills: 0 | Status: SHIPPED | OUTPATIENT
Start: 2021-01-05 | End: 2021-01-10

## 2021-01-05 RX ORDER — SODIUM CHLORIDE 0.9 % (FLUSH) 0.9 %
10 SYRINGE (ML) INJECTION AS NEEDED
Status: DISCONTINUED | OUTPATIENT
Start: 2021-01-05 | End: 2021-01-05 | Stop reason: HOSPADM

## 2021-01-05 RX ORDER — OXYCODONE HYDROCHLORIDE AND ACETAMINOPHEN 5; 325 MG/1; MG/1
1 TABLET ORAL EVERY 8 HOURS PRN
Qty: 10 TABLET | Refills: 0 | Status: SHIPPED | OUTPATIENT
Start: 2021-01-05 | End: 2021-01-08

## 2021-01-05 RX ORDER — OXYCODONE HYDROCHLORIDE AND ACETAMINOPHEN 5; 325 MG/1; MG/1
1 TABLET ORAL EVERY 8 HOURS PRN
Qty: 10 TABLET | Refills: 0 | Status: SHIPPED | OUTPATIENT
Start: 2021-01-05 | End: 2021-01-05

## 2021-01-05 RX ORDER — ONDANSETRON 4 MG/1
4 TABLET, ORALLY DISINTEGRATING ORAL EVERY 4 HOURS PRN
Qty: 15 TABLET | Refills: 0 | Status: SHIPPED | OUTPATIENT
Start: 2021-01-05 | End: 2021-01-05

## 2021-01-05 RX ORDER — HYDROMORPHONE HCL 110MG/55ML
0.5 PATIENT CONTROLLED ANALGESIA SYRINGE INTRAVENOUS ONCE
Status: COMPLETED | OUTPATIENT
Start: 2021-01-05 | End: 2021-01-05

## 2021-01-05 RX ADMIN — ONDANSETRON 4 MG: 2 INJECTION, SOLUTION INTRAMUSCULAR; INTRAVENOUS at 12:27

## 2021-01-05 RX ADMIN — HYDROMORPHONE HYDROCHLORIDE 0.5 MG: 2 INJECTION, SOLUTION INTRAMUSCULAR; INTRAVENOUS; SUBCUTANEOUS at 12:28

## 2021-01-05 RX ADMIN — MORPHINE SULFATE 2 MG: 4 INJECTION INTRAVENOUS at 14:06

## 2021-01-05 RX ADMIN — ONDANSETRON 4 MG: 2 INJECTION, SOLUTION INTRAMUSCULAR; INTRAVENOUS at 14:03

## 2021-01-05 RX ADMIN — SODIUM CHLORIDE 1000 ML: 9 INJECTION, SOLUTION INTRAVENOUS at 12:31

## 2021-01-05 NOTE — ED PROVIDER NOTES
"Subjective   History of Present Illness  History of Present Illness    Chief complaint: Abdominal and pelvic pain, nausea and vomiting, ongoing miscarriage    Location: Lower abdomen    Quality/Severity: Severe cramping pain    Timing/Duration: Pain since yesterday    Modifying Factors: None    Narrative: This patient presents via EMS for evaluation of lower abdominal pain with nausea and vomiting.  Unfortunately, she just learned yesterday that she is going through miscarriage.  Her GYN doctor evaluated her yesterday and sent her home with a prescription for Percocet tablets to take for her pain.  Her ultrasound showed a 5-week progressed intrauterine pregnancy but her quantitative hCGs were decreasing.  Patient says that she has had a lot of vaginal bleeding and passed some large clots as well as what she believes was some fetal tissue late last evening.  Since that time she has continued to have a lot of pain and cramping and she tried to take her pain medications but could not keep them down because of nausea and vomiting.    Associated Symptoms: As above    Review of Systems   Constitutional: Negative for activity change and fever.   HENT: Negative.    Respiratory: Negative for cough and shortness of breath.    Cardiovascular: Negative for chest pain.   Gastrointestinal: Positive for abdominal pain, nausea and vomiting.   Genitourinary: Positive for pelvic pain and vaginal bleeding. Negative for dysuria.   Skin: Negative for color change and wound.   Neurological: Negative for syncope.   All other systems reviewed and are negative.      Past Medical History:   Diagnosis Date   • ADD (attention deficit disorder)    • Anxiety    • UTI (urinary tract infection)        Allergies   Allergen Reactions   • Lyrica [Pregabalin] Confusion     \" WAS MAKING BARKING SOUNDS AND MY ARMS WERE JERKING\".       Past Surgical History:   Procedure Laterality Date   • BREAST SURGERY      IMPLANTS       History reviewed. No pertinent " family history.    Social History     Socioeconomic History   • Marital status: Single     Spouse name: Not on file   • Number of children: Not on file   • Years of education: Not on file   • Highest education level: Not on file   Tobacco Use   • Smoking status: Never Smoker   • Smokeless tobacco: Never Used   Substance and Sexual Activity   • Alcohol use: No     Frequency: Never   • Drug use: No   • Sexual activity: Yes     Partners: Male     Birth control/protection: None     Comment: spouse = CLIFF      ED Triage Vitals [01/05/21 1201]   Temp Heart Rate Resp BP SpO2   98.7 °F (37.1 °C) 78 14 105/66 100 %      Temp src Heart Rate Source Patient Position BP Location FiO2 (%)   Oral Monitor Lying Right arm --     Objective   Physical Exam  Vitals signs and nursing note reviewed.   Constitutional:       General: She is in acute distress.      Appearance: She is well-developed and normal weight. She is not diaphoretic.      Comments: Appears uncomfortable.  Holding lower abdomen.   HENT:      Head: Normocephalic and atraumatic.   Eyes:      General:         Right eye: No discharge.         Left eye: No discharge.      Conjunctiva/sclera: Conjunctivae normal.      Pupils: Pupils are equal, round, and reactive to light.   Neck:      Musculoskeletal: Normal range of motion and neck supple.   Cardiovascular:      Rate and Rhythm: Normal rate and regular rhythm.      Pulses: Normal pulses.      Heart sounds: No murmur.   Pulmonary:      Effort: Pulmonary effort is normal. No respiratory distress.      Breath sounds: Normal breath sounds. No stridor. No wheezing or rhonchi.   Abdominal:      General: Abdomen is flat.      Palpations: There is no mass.      Tenderness: There is abdominal tenderness. There is no guarding or rebound.      Hernia: No hernia is present.      Comments: Mild tenderness to palpation along the lower half of the abdomen only.  No peritoneal features anywhere.   Musculoskeletal: Normal range of  motion.         General: No deformity.   Skin:     General: Skin is warm and dry.      Findings: No bruising, erythema or rash.   Neurological:      Mental Status: She is alert and oriented to person, place, and time.   Psychiatric:         Behavior: Behavior normal.         Thought Content: Thought content normal.         Judgment: Judgment normal.      Comments: Depressed, emotional affect       Results for orders placed or performed during the hospital encounter of 01/05/21   Comprehensive Metabolic Panel    Specimen: Blood   Result Value Ref Range    Glucose 104 (H) 65 - 99 mg/dL    BUN 10 6 - 20 mg/dL    Creatinine 0.74 0.57 - 1.00 mg/dL    Sodium 135 (L) 136 - 145 mmol/L    Potassium 4.3 3.5 - 5.2 mmol/L    Chloride 101 98 - 107 mmol/L    CO2 24.9 22.0 - 29.0 mmol/L    Calcium 9.2 8.6 - 10.5 mg/dL    Total Protein 7.4 6.0 - 8.5 g/dL    Albumin 4.30 3.50 - 5.20 g/dL    ALT (SGPT) 13 1 - 33 U/L    AST (SGOT) 18 1 - 32 U/L    Alkaline Phosphatase 63 39 - 117 U/L    Total Bilirubin 0.4 0.0 - 1.2 mg/dL    eGFR Non African Amer 92 >60 mL/min/1.73    Globulin 3.1 gm/dL    A/G Ratio 1.4 g/dL    BUN/Creatinine Ratio 13.5 7.0 - 25.0    Anion Gap 9.1 5.0 - 15.0 mmol/L   Protime-INR    Specimen: Blood   Result Value Ref Range    Protime 13.6 12.1 - 15.0 Seconds    INR 1.07 0.90 - 1.10   aPTT    Specimen: Blood   Result Value Ref Range    PTT 29.9 24.3 - 38.1 seconds   CBC Auto Differential    Specimen: Blood   Result Value Ref Range    WBC 16.93 (H) 3.40 - 10.80 10*3/mm3    RBC 3.85 3.77 - 5.28 10*6/mm3    Hemoglobin 11.5 (L) 12.0 - 15.9 g/dL    Hematocrit 35.2 34.0 - 46.6 %    MCV 91.4 79.0 - 97.0 fL    MCH 29.9 26.6 - 33.0 pg    MCHC 32.7 31.5 - 35.7 g/dL    RDW 12.9 12.3 - 15.4 %    RDW-SD 43.5 37.0 - 54.0 fl    MPV 9.8 6.0 - 12.0 fL    Platelets 246 140 - 450 10*3/mm3    Neutrophil % 82.9 (H) 42.7 - 76.0 %    Lymphocyte % 10.1 (L) 19.6 - 45.3 %    Monocyte % 6.3 5.0 - 12.0 %    Eosinophil % 0.4 0.3 - 6.2 %    Basophil  "% 0.1 0.0 - 1.5 %    Immature Grans % 0.2 0.0 - 0.5 %    Neutrophils, Absolute 14.02 (H) 1.70 - 7.00 10*3/mm3    Lymphocytes, Absolute 1.71 0.70 - 3.10 10*3/mm3    Monocytes, Absolute 1.07 (H) 0.10 - 0.90 10*3/mm3    Eosinophils, Absolute 0.07 0.00 - 0.40 10*3/mm3    Basophils, Absolute 0.02 0.00 - 0.20 10*3/mm3    Immature Grans, Absolute 0.04 0.00 - 0.05 10*3/mm3   hCG, Quantitative, Pregnancy    Specimen: Blood   Result Value Ref Range    HCG Quantitative 1,439.00 mIU/mL       Procedures           ED Course  ED Course as of Jan 05 1445 Tue Jan 05, 2021   1444 I reviewed the labs from today's visit.  Patient's hemoglobin and hematocrit seem to be stable.  Her quantitative hCG is appropriately lower.  We observe the patient for a couple of hours here and she did very well overall.  Initially she had a lot of pain and nausea.  She got the most benefit I think out of the IV Zofran.  Once her nausea was controlled, she began to feel much better.  Her hemodynamic stayed pretty normal here.  I spoke with her GYN doctor on the phone.  He felt that as long as her symptoms were controlled she could return home to continue symptomatic management.  I did write a prescription for Zofran tablets and Percocet tablets for the patient to take as needed.  I advised her to follow-up with Dr. Davis in the office for repeat evaluation.  She was discharged home in good condition after that with usual \"return to ER\" instructions for any worsening signs or symptoms.    [LIZETH]      ED Course User Index  [LIZETH] Kurt Meier MD                                           MDM  Number of Diagnoses or Management Options  Incomplete miscarriage:   Non-intractable vomiting with nausea, unspecified vomiting type:      Amount and/or Complexity of Data Reviewed  Clinical lab tests: reviewed and ordered  Decide to obtain previous medical records or to obtain history from someone other than the patient: yes  Review and summarize past medical records: " yes  Discuss the patient with other providers: yes (Dr. Davis, GYN)    Risk of Complications, Morbidity, and/or Mortality  Presenting problems: moderate  Diagnostic procedures: low  Management options: moderate        Final diagnoses:   Incomplete miscarriage   Non-intractable vomiting with nausea, unspecified vomiting type            Kurt Meier MD  01/05/21 7964

## 2021-01-05 NOTE — TELEPHONE ENCOUNTER
Had a discussion with , patient had not taken a pain medicine in over 6 hours but the bleeding is continuing.  Options were reviewed, they want to had to the emergency room for better pain control and reevaluation.

## 2021-01-05 NOTE — TELEPHONE ENCOUNTER
returned call, stated that his wife is currently going through a miscarriage and the patient is experiencing some extreme pain and constant bleeding, they are requesting that they speak with  in regards to her pain and bleeding.    They also mentioned  that  prescribed the patient some  Oxycodone and its not helping with her pain.     Call Back Number: [phone]

## 2021-01-05 NOTE — DISCHARGE INSTRUCTIONS
Recommend gentle diet and plenty of fluids as tolerated.  Please return to the emergency room for any worsening pain, fevers, nausea, vomiting, bleeding, dizziness, weakness or any other concerns.

## 2021-04-13 ENCOUNTER — TELEPHONE (OUTPATIENT)
Dept: OBSTETRICS AND GYNECOLOGY | Age: 31
End: 2021-04-13

## 2021-04-13 ENCOUNTER — OFFICE VISIT (OUTPATIENT)
Dept: OBSTETRICS AND GYNECOLOGY | Age: 31
End: 2021-04-13

## 2021-04-13 VITALS
WEIGHT: 115 LBS | DIASTOLIC BLOOD PRESSURE: 64 MMHG | HEIGHT: 64 IN | BODY MASS INDEX: 19.63 KG/M2 | SYSTOLIC BLOOD PRESSURE: 100 MMHG

## 2021-04-13 DIAGNOSIS — Z31.69 PRE-CONCEPTION COUNSELING: ICD-10-CM

## 2021-04-13 DIAGNOSIS — N92.6 IRREGULAR MENSES: Primary | ICD-10-CM

## 2021-04-13 PROBLEM — O03.2 INCOMPLETE MISCARRIAGE WITH BLOOD CLOT: Status: RESOLVED | Noted: 2021-01-04 | Resolved: 2021-04-13

## 2021-04-13 LAB
B-HCG UR QL: NEGATIVE
INTERNAL NEGATIVE CONTROL: NEGATIVE
INTERNAL POSITIVE CONTROL: POSITIVE
Lab: NORMAL

## 2021-04-13 PROCEDURE — 99213 OFFICE O/P EST LOW 20 MIN: CPT | Performed by: OBSTETRICS & GYNECOLOGY

## 2021-04-13 PROCEDURE — 81025 URINE PREGNANCY TEST: CPT | Performed by: OBSTETRICS & GYNECOLOGY

## 2021-04-13 RX ORDER — FLUOXETINE HYDROCHLORIDE 20 MG/5ML
LIQUID ORAL DAILY
COMMUNITY
End: 2023-01-05

## 2021-04-13 NOTE — PROGRESS NOTES
Subjective       History of Present Illness  Nadya Cisneros is a 30 y.o. female is being seen today for discussion and evaluation of irregular cycles, she had a miscarriage in early January, she passing spontaneously she had a negative UCG a couple days afterwards.  But since that time her cycles have only been 21 to 22 days.  She would like to have a lot of children and does not want to wait to conceive spontaneously is interested going on Clomid.  She has no galactorrhea hirsutism she is not take any herbs or supplements.  Negative UCG in the office today  Chief Complaint   Patient presents with   • Gynecologic Exam     Gyn problem: trouble conceiving,discuss clomid   .        The following portions of the patient's history were reviewed and updated as appropriate: allergies, current medications, past family history, past medical history, past social history, past surgical history and problem list.    PAST MEDICAL HISTORY  Past Medical History:   Diagnosis Date   • ADD (attention deficit disorder)    • Anxiety    • UTI (urinary tract infection)      OB History    Para Term  AB Living   1             SAB TAB Ectopic Molar Multiple Live Births                    # Outcome Date GA Lbr Damian/2nd Weight Sex Delivery Anes PTL Lv   1               Past Surgical History:   Procedure Laterality Date   • BREAST SURGERY      IMPLANTS     No family history on file.  Social History     Tobacco Use   Smoking Status Never Smoker   Smokeless Tobacco Never Used       Current Outpatient Medications:   •  FLUoxetine (PROzac) 20 MG/5ML solution, Take  by mouth Daily., Disp: , Rfl:   •  Prenatal MV & Min w/FA-DHA (ONE A DAY PRENATAL PO), Take  by mouth., Disp: , Rfl:   •  clomiPHENE (CLOMID) 50 MG tablet, Take 1 tablet by mouth Daily., Disp: 5 tablet, Rfl: 2    There is no immunization history on file for this patient.    Review of Systems       Except as outlined in history of physical illness, patient denies  any changes in her GYN, , GI systems. All other systems reviewed are negative.    Objective   Physical Exam   Alert and oriented, respirations unlabored, heart regular rate and rhythm   Pelvic external genitalia normal vagina clean dry intact cervix uterus adnexa nonenlarged nontender rectal exams normal      Assessment/Plan   Diagnoses and all orders for this visit:    1. Irregular menses (Primary)  -     Progesterone  -     TSH  -     Estradiol    2. Pre-conception counseling  -     Progesterone  -     TSH  -     Estradiol    Other orders  -     clomiPHENE (CLOMID) 50 MG tablet; Take 1 tablet by mouth Daily.  Dispense: 5 tablet; Refill: 2    Lab work is normal, patient will take Clomid days 5 through 9, do her BBT's, check UCG's, and if not pregnant in 3 months time would like her to return to the office for reevaluation             Orders Placed This Encounter   Procedures   • Progesterone     Order Specific Question:   Release to patient     Answer:   Immediate   • TSH     Order Specific Question:   Release to patient     Answer:   Immediate   • Estradiol     Order Specific Question:   Release to patient     Answer:   Immediate           EMR Dragon/ Transcription disclaimer:  Much of the encounter note is an electronic transcription/translation of spoken language to printed text. The electronic translation of spoken language may permit erroneous, or at times, nonessential words or phrases to be inadvertently transcribes; Although i have reviewed the note for such errors, some may still exist.

## 2021-04-13 NOTE — TELEPHONE ENCOUNTER
Pt called stating she is having trouble conceiving and would like to try Clomid. Will you call this in for her or does she need an appt? Please advise. Pharmacy verified.

## 2021-04-14 ENCOUNTER — TELEPHONE (OUTPATIENT)
Dept: OBSTETRICS AND GYNECOLOGY | Age: 31
End: 2021-04-14

## 2021-04-14 LAB
ESTRADIOL SERPL-MCNC: 160 PG/ML
PROGEST SERPL-MCNC: 8.9 NG/ML
TSH SERPL DL<=0.005 MIU/L-ACNC: 2.3 UIU/ML (ref 0.27–4.2)

## 2021-04-14 NOTE — TELEPHONE ENCOUNTER
----- Message from Kodi Davis MD sent at 4/14/2021  9:57 AM EDT -----   Please notify pt. That labs are with in normal limits okay to go ahead and start Clomid as discussed

## 2021-04-14 NOTE — PROGRESS NOTES
Please notify pt. That labs are with in normal limits okay to go ahead and start Clomid as discussed

## 2021-06-02 ENCOUNTER — TELEPHONE (OUTPATIENT)
Dept: OBSTETRICS AND GYNECOLOGY | Age: 31
End: 2021-06-02

## 2021-06-02 NOTE — TELEPHONE ENCOUNTER
Link pt    Pt called stating that she would like the dose increased for the medication Clomid. Pt stated that she came on her cycle within 20 days but she never ovulated. Please advise    [phone]

## 2021-06-03 NOTE — TELEPHONE ENCOUNTER
Please let patient know I would like to have a telephone visit/consultation with her, maybe we can schedule that at the end of the the day on next Monday around 4:00

## 2021-06-07 ENCOUNTER — OFFICE VISIT (OUTPATIENT)
Dept: OBSTETRICS AND GYNECOLOGY | Age: 31
End: 2021-06-07

## 2021-06-07 DIAGNOSIS — N92.6 IRREGULAR MENSES: ICD-10-CM

## 2021-06-07 DIAGNOSIS — Z31.69 PRE-CONCEPTION COUNSELING: Primary | ICD-10-CM

## 2021-06-07 PROCEDURE — 99212 OFFICE O/P EST SF 10 MIN: CPT | Performed by: OBSTETRICS & GYNECOLOGY

## 2021-06-07 NOTE — PROGRESS NOTES
Telephone visit  Subjective   Patient consented to a telephone interview    History of Present Illness  Nadya Cisneros is a 30 y.o. female is being seen today for history of irregular cycles and previous 5-week miscarriage.  Patient is overall healthy she has anxiety and attention deficit disorder.  But she was started on Clomid in April 50 mg    Took on the  through the , ovulated on the 10th or 11th day but did not conceived.  They had intercourse every other day during the fertile time    Took 50 on May 14-, patient does not think she ovulated and had a regular amount of intercourse.    Took 50 mg Debra 3 through  which is today and will continue taking ovulation test, intercourse every other day during fertile periods    She continues not to have any galactorrhea or hirsutism  No chief complaint on file.  .        The following portions of the patient's history were reviewed and updated as appropriate: allergies, current medications, past family history, past medical history, past social history, past surgical history and problem list.    PAST MEDICAL HISTORY  Past Medical History:   Diagnosis Date   • ADD (attention deficit disorder)    • Anxiety    • UTI (urinary tract infection)      OB History    Para Term  AB Living   1             SAB TAB Ectopic Molar Multiple Live Births                    # Outcome Date GA Lbr Damian/2nd Weight Sex Delivery Anes PTL Lv   1               Past Surgical History:   Procedure Laterality Date   • BREAST SURGERY      IMPLANTS     No family history on file.  Social History     Tobacco Use   Smoking Status Never Smoker   Smokeless Tobacco Never Used       Current Outpatient Medications:   •  clomiPHENE (CLOMID) 50 MG tablet, Take 1 tablet by mouth Daily., Disp: 5 tablet, Rfl: 2  •  FLUoxetine (PROzac) 20 MG/5ML solution, Take  by mouth Daily., Disp: , Rfl:   •  Prenatal MV & Min w/FA-DHA (ONE A DAY PRENATAL PO), Take  by mouth., Disp: , Rfl:      There is no immunization history on file for this patient.    Review of Systems       Except as outlined in history of physical illness, patient denies any changes in her GYN, , GI systems. All other systems reviewed are negative.    Objective   Physical Exam   Alert and oriented, respirations unlabored, heart regular rate and rhythm   Pelvic telephone visit      Assessment/Plan   Diagnoses and all orders for this visit:    1. Pre-conception counseling (Primary)  -     Ambulatory Referral to Infertility    2. Irregular menses      See above note, this will be her third round of Clomid on 50 mg she ovulated on the first cycle but not sure on the second, if she does not ovulate and not conceiving this cycle she is consented to see reproductive endocrinologist and I have put a referral in.  Additionally she is can have her  check a semen analysis.  She will continue prenatal vitamins           Orders Placed This Encounter   Procedures   • Ambulatory Referral to Infertility     Referral Priority:   Routine     Referral Type:   Consultation     Referral Reason:   Specialty Services Required     Referred to Provider:   Cristiana Lam MD     Requested Specialty:   Obstetrics     Number of Visits Requested:   1           EMR Dragon/ Transcription disclaimer:  Much of the encounter note is an electronic transcription/translation of spoken language to printed text. The electronic translation of spoken language may permit erroneous, or at times, nonessential words or phrases to be inadvertently transcribes; Although i have reviewed the note for such errors, some may still exist.

## 2021-06-28 ENCOUNTER — TELEPHONE (OUTPATIENT)
Dept: OBSTETRICS AND GYNECOLOGY | Age: 31
End: 2021-06-28

## 2021-06-28 NOTE — TELEPHONE ENCOUNTER
----- Message from Kodi Davis MD sent at 6/28/2021  9:43 AM EDT -----  Regarding: FW: Visit Follow-Up Question  Contact: [phone]      ----- Message -----  From: Rosy Rosado MA  Sent: 6/28/2021   8:47 AM EDT  To: Kodi Davis MD  Subject: FW: Visit Follow-Up Question                         ----- Message -----  From: Nadya Cisneros  Sent: 6/27/2021   2:46 PM EDT  To: Kam Milian [facility]  Subject: Visit Follow-Up Question                         Hello Dr. Davis,     Today is the 29th day of my cycle and I have still not started my period. My periods normally come every 24 days. I have taken several pregnancy tests and they are all negative. I ovulated on the 11th or 12th day of my cycle. I feel like I am dealing with lingering PMS and just can't understand why the next cycle has not begun.   Could you please help me understand what may be happening with my body? I am so frustrated.     Thank you so much,  Nadya Cisneros

## 2021-10-27 ENCOUNTER — TELEPHONE (OUTPATIENT)
Dept: OBSTETRICS AND GYNECOLOGY | Age: 31
End: 2021-10-27

## 2021-10-27 NOTE — TELEPHONE ENCOUNTER
I have a lot of confidence in him in his office, have used them for years.  Good success rates, [place] is a good center as well the problem is the distance you have to travel especially during those critical periods where timing is everything.  If she wants a second opinion Dianne that is fine but I have a lot of confidence in Dr. English

## 2021-10-27 NOTE — TELEPHONE ENCOUNTER
Spoke with patient and she is wanting to know a few things about 's office.His success rate,how good of a doctor he is and do you strongly recommend him? She stated she is scheduled next week for IVF,and given the cost of this,she wants to make sure she is at the right place.A few of her friends recommended a place in [place].She wants reassurance.

## 2021-10-27 NOTE — TELEPHONE ENCOUNTER
Patient would like a call back:  She says she has some questions about the fertility MD you referred her to.

## 2023-01-05 ENCOUNTER — OFFICE VISIT (OUTPATIENT)
Dept: OBSTETRICS AND GYNECOLOGY | Facility: CLINIC | Age: 33
End: 2023-01-05
Payer: COMMERCIAL

## 2023-01-05 VITALS
HEIGHT: 64 IN | DIASTOLIC BLOOD PRESSURE: 70 MMHG | SYSTOLIC BLOOD PRESSURE: 110 MMHG | WEIGHT: 125 LBS | BODY MASS INDEX: 21.34 KG/M2

## 2023-01-05 DIAGNOSIS — N92.6 IRREGULAR BLEEDING: ICD-10-CM

## 2023-01-05 DIAGNOSIS — Z98.891 STATUS POST C-SECTION: ICD-10-CM

## 2023-01-05 DIAGNOSIS — F41.9 ANXIETY: Primary | ICD-10-CM

## 2023-01-05 PROCEDURE — 99214 OFFICE O/P EST MOD 30 MIN: CPT | Performed by: NURSE PRACTITIONER

## 2023-01-05 RX ORDER — HYDROXYZINE HYDROCHLORIDE 25 MG/1
TABLET, FILM COATED ORAL
COMMUNITY
Start: 2022-12-14

## 2023-01-05 RX ORDER — ONDANSETRON 4 MG/1
4 TABLET, FILM COATED ORAL DAILY PRN
Qty: 30 TABLET | Refills: 1 | Status: SHIPPED | OUTPATIENT
Start: 2023-01-05 | End: 2024-01-05

## 2023-01-05 RX ORDER — IBUPROFEN 600 MG/1
TABLET ORAL
COMMUNITY
Start: 2022-11-25

## 2023-01-05 RX ORDER — CEPHALEXIN 500 MG/1
500 CAPSULE ORAL 4 TIMES DAILY
Qty: 40 CAPSULE | Refills: 0 | Status: SHIPPED | OUTPATIENT
Start: 2023-01-05 | End: 2023-01-15

## 2023-01-05 RX ORDER — FLUOXETINE HYDROCHLORIDE 20 MG/1
40 CAPSULE ORAL DAILY
COMMUNITY
End: 2023-03-21

## 2023-01-05 RX ORDER — BUSPIRONE HYDROCHLORIDE 5 MG/1
5 TABLET ORAL 3 TIMES DAILY
Qty: 90 TABLET | Refills: 0 | Status: SHIPPED | OUTPATIENT
Start: 2023-01-05 | End: 2023-03-21

## 2023-01-05 NOTE — PROGRESS NOTES
Subjective   Chief Complaint   Patient presents with   • [facility]     Nadya Cisneros is a 32 y.o. year old .  Patient's last menstrual period was 2022 (approximate).  She presents to office to be evaluated for a few concerns today.  She is accompanied to visit by her father and her  daughter..  Nadya delivered via primary  after failed induction of labor at 42 weeks on 2022.  Infant was noted to be transverse at time of  delivery.  She had postpartum pre-eclampsia and was treated with magnesium sulfate.  She states that about a day and a half after discharge from hospital after delivery she returned due to increased bleeding.  She states she did not have an ultrasound at that time her blood count levels were monitored while in the ER and she was discharged home.  She is currently taking a prenatal vitamin but not an iron supplement.  She states that she has continued to have periods high blood flow since her delivery.  She reports as recent as today passing clots with her bleeding.  She is breastfeeding, states that her previous midwife told her she has mastitis however she has not been treated with antibiotic.  She states she has been doing warm compresses which helps some with symptoms.  She denies feeling like she has a fever.  She denies pain at her  incision.  She does report overwhelming fatigue.  She is not sleeping well and when she is taking medication to help her sleep.  Pregnancy was result of IVF.  She had taken Prozac throughout her pregnancy until the last month.  She restarted her Prozac a few weeks ago at 40 mg.  She states that her depression symptoms did improve some after restarting Prozac however she has severe anxiety symptoms but the Prozac has not helped with her anxiety at all.  She has concerns with not fully bonding with the baby.  She has strong family support.  Her parents have been staying with her since delivery and she has a  supportive .  Currently they have a Dula coming to the house between the hours of 10 and 2 during the day to help with baby care and allow Nadya to rest.   Of these concerns her most concerning symptom is anxiety.  [place]  pression scale score of 27.  With a score of 1  forhardly ever having thoughts of harming herself.  She has no thoughts of harming the infant.  She has made a statement to her family that she feels it may be best for baby if they care for the baby instead of her.  She felt frequently like she needed to just leave her home.  She has not acted on these feelings.  She is very open with her family about how she is feeling.  She has not established care with a mental health provider.         The following portions of the patient's history were reviewed and updated as appropriate:current medications and allergies    Social History    Tobacco Use      Smoking status: Never      Smokeless tobacco: Never         Objective   /70 (BP Location: Right arm, Patient Position: Sitting, Cuff Size: Adult)   Ht 162.6 cm (64\")   Wt 56.7 kg (125 lb)   LMP 2022 (Approximate)   Breastfeeding Yes   BMI 21.46 kg/m²   Breasts: breasts appear normal, no suspicious masses, no skin or nipple changes or axillary nodes, right breast with 3 cm area of erythema on outer edge of breast with warmth noted.  Most consistent with mastitis..    Lab Review   No data reviewed    Imaging   Transvaginal ultrasound in office today shows thickened and complex endometrium with concerns for retained products of conception.     Assessment   1. Postpartum depression and anxiety  2. Prolonged vaginal bleeding with concerns for retained products of conception  3. Mastitis     Plan   1. Ultrasound findings reviewed with patient.  Discussed need for D&C.  She has a follow-up with her regular OB tomorrow and will discuss this with them.  Client lab work today as she feels she will have blood work drawn tomorrow at  her regular appointment.  2. Discussed mastitis.  We will start Keflex.  Continue with frequent nursing and warm compress massage.  3. Postpartum anxiety and depression reviewed at length with patient and her father.  Continue Prozac.  We will add in BuSpar 5 mg 3 times daily for anxiety.  Information given for postpartum adjustment center with strong encouragement to follow-up with mental health provider.  Nadya knows to call provider with any worsening symptoms.  Discussed with Nadya although she may not feel like she is bonding with infant well now this does not mean that she would not bond with her in the future.  Reassured patient she is providing excellent care for her baby.  Encourage patient to continue to have upper communication with her provider and with her family.  Her family will continue to be with her and help her care for the baby until she feels her anxiety has much improved.  4. The importance of keeping all planned follow-up and taking all medications as prescribed was emphasized.  5. She has follow-up appointment with her regular OB in the morning.  She knows to reach out to provider for any further follow-up.  We will be in contact with patient through [employer] and will schedule an office follow-up as needed.    New Medications Ordered This Visit   Medications   • busPIRone (BUSPAR) 5 MG tablet     Sig: Take 1 tablet by mouth 3 (Three) Times a Day.     Dispense:  90 tablet     Refill:  0   • cephalexin (Keflex) 500 MG capsule     Sig: Take 1 capsule by mouth 4 (Four) Times a Day for 10 days.     Dispense:  40 capsule     Refill:  0   • ondansetron (Zofran) 4 MG tablet     Sig: Take 1 tablet by mouth Daily As Needed for Nausea or Vomiting.     Dispense:  30 tablet     Refill:  1          This note was electronically signed.    Babita Melgoza, MARIXA  January 6, 2023

## 2023-03-19 NOTE — PROGRESS NOTES
"Subjective   Chief Complaint   Patient presents with   • Urinary Tract Infection     Recurrent UTI   • Follow-up     Pt states that she is having some discomfort that is stopping her from having intercourse with her .     Nadya Cisneros is a 32 y.o. year old .  Patient's last menstrual period was 2022 (approximate).  She presents to be seen for concerns for dyspareunia, urinary discomfort and vaginal discomfort.  She and her  have tried to have intercourse once since delivery of the baby and this was very painful for her and they had to stop.  She is also concerned for possible UTI as she has urinary frequency and discomfort with voiding.  She has no concerns for STD screening. Her postpartum depression is markedly improved.  [place]  depression scale was 0 today.  She is taking Lexapro and BuSpar.  Denies any mental health concerns or need for refills today.  She is breast-feeding her daughter and states this is going well.  She has used over-the-counter lubricants with sexual intercourse.    The following portions of the patient's history were reviewed and updated as appropriate:current medications and allergies    Social History    Tobacco Use      Smoking status: Never      Smokeless tobacco: Never         Objective   /60 (BP Location: Left arm, Patient Position: Sitting, Cuff Size: Adult)   Ht 162.6 cm (64\")   Wt 55.8 kg (123 lb)   LMP 2022 (Approximate)   Breastfeeding Yes   BMI 21.11 kg/m²   Pelvic exam: VULVA: normal appearing vulva with no masses, tenderness or lesions,  VAGINA: vaginal erythema bilaterally, CERVIX: normal appearing cervix without discharge or lesions.  1 swab collected    Lab Review   No data reviewed    Imaging   No data reviewed        Assessment   1. Dyspareunia  2. Currently breast-feeding  3. Postpartum depression much improved on current medication regimen     Plan   1. Reviewed with patient, no changes with breast-feeding could " cause some vaginal atrophic changes.  Sample of Premarin cream given in office today with instructions for use.  2. 1 swab collected to rule out yeast or bacterial vaginosis.  3. Continue use of over-the-counter silicone or water-based lubricants with intercourse  4. Urine sent for UA  5. Continue current Lexapro and BuSpar doses for postpartum depression  6. The importance of keeping all planned follow-up and taking all medications as prescribed was emphasized.  7. Return to care for annual or as needed    No orders of the defined types were placed in this encounter.         This note was electronically signed.    Babita Melgoza, APRN  March 21, 2023

## 2023-03-21 ENCOUNTER — OFFICE VISIT (OUTPATIENT)
Dept: OBSTETRICS AND GYNECOLOGY | Facility: CLINIC | Age: 33
End: 2023-03-21
Payer: COMMERCIAL

## 2023-03-21 VITALS
WEIGHT: 123 LBS | DIASTOLIC BLOOD PRESSURE: 60 MMHG | SYSTOLIC BLOOD PRESSURE: 100 MMHG | BODY MASS INDEX: 21 KG/M2 | HEIGHT: 64 IN

## 2023-03-21 DIAGNOSIS — N89.8 VAGINAL IRRITATION: Primary | ICD-10-CM

## 2023-03-21 DIAGNOSIS — F41.9 ANXIETY: ICD-10-CM

## 2023-03-21 LAB
BILIRUB UR QL STRIP: NEGATIVE
CLARITY UR: CLEAR
COLOR UR: YELLOW
GLUCOSE UR STRIP-MCNC: NEGATIVE MG/DL
HGB UR QL STRIP.AUTO: NEGATIVE
KETONES UR QL STRIP: NEGATIVE
LEUKOCYTE ESTERASE UR QL STRIP.AUTO: NEGATIVE
NITRITE UR QL STRIP: NEGATIVE
PH UR STRIP.AUTO: 6 [PH] (ref 5–8)
PROT UR QL STRIP: ABNORMAL
SP GR UR STRIP: 1.03 (ref 1–1.03)
UROBILINOGEN UR QL STRIP: ABNORMAL

## 2023-03-21 PROCEDURE — 81003 URINALYSIS AUTO W/O SCOPE: CPT | Performed by: NURSE PRACTITIONER

## 2023-03-21 PROCEDURE — 99214 OFFICE O/P EST MOD 30 MIN: CPT | Performed by: NURSE PRACTITIONER

## 2023-03-21 RX ORDER — BUSPIRONE HYDROCHLORIDE 15 MG/1
TABLET ORAL
COMMUNITY
Start: 2023-01-26

## 2023-03-21 RX ORDER — FLUOXETINE HYDROCHLORIDE 40 MG/1
CAPSULE ORAL
COMMUNITY
Start: 2023-01-07

## 2023-03-24 DIAGNOSIS — B96.89 BV (BACTERIAL VAGINOSIS): Primary | ICD-10-CM

## 2023-03-24 DIAGNOSIS — N76.0 BV (BACTERIAL VAGINOSIS): Primary | ICD-10-CM

## 2023-03-24 RX ORDER — METRONIDAZOLE 500 MG/1
500 TABLET ORAL 2 TIMES DAILY
Qty: 14 TABLET | Refills: 0 | Status: SHIPPED | OUTPATIENT
Start: 2023-03-24 | End: 2023-03-31

## 2023-04-03 RX ORDER — CONJUGATED ESTROGENS 0.62 MG/G
CREAM VAGINAL
Qty: 1 EACH | Refills: 1 | Status: SHIPPED | OUTPATIENT
Start: 2023-04-03 | End: 2023-04-04

## 2023-04-04 ENCOUNTER — TELEPHONE (OUTPATIENT)
Dept: OBSTETRICS AND GYNECOLOGY | Facility: CLINIC | Age: 33
End: 2023-04-04
Payer: COMMERCIAL

## 2023-04-04 DIAGNOSIS — N89.8 VAGINAL IRRITATION: Primary | ICD-10-CM

## 2023-04-04 RX ORDER — ESTRADIOL 0.1 MG/G
CREAM VAGINAL
Qty: 1 EACH | Refills: 12 | Status: SHIPPED | OUTPATIENT
Start: 2023-04-04

## 2023-04-04 NOTE — TELEPHONE ENCOUNTER
----- Message from Babita Melgoza CNM sent at 4/3/2023  2:48 PM EDT -----  Regarding: FW: Refill Please :)  Contact: [phone]  can you send this in for Nadya please? generic estrace cream to [facility].  [place]  ----- Message -----  From: Eri Hough MA  Sent: 4/3/2023   9:18 AM CDT  To: Babita Melgoza CNM  Subject: FW: Refill Please :)                               ----- Message -----  From: Nadya Cisneros  Sent: 4/3/2023  10:11 AM EDT  To: [facility]  Subject: Refill Please :)                                 [facility]!   Thank you so much for the sample of Premarin, it has helped tremendously (along with the antibiotic you prescribed). :) I am almost out of the Premarin sample so I wanted to see if you could call in a prescription to [facility] for me? I am SO thankful to be feeling better! You are amazing!!  Thank you again :)  Nadya

## 2024-01-27 DIAGNOSIS — Z34.90 PREGNANCY, UNSPECIFIED GESTATIONAL AGE: Primary | ICD-10-CM

## 2024-01-28 LAB — HCG INTACT+B SERPL-ACNC: NORMAL MIU/ML

## 2024-01-29 ENCOUNTER — TELEPHONE (OUTPATIENT)
Dept: OBSTETRICS AND GYNECOLOGY | Facility: CLINIC | Age: 34
End: 2024-01-29
Payer: COMMERCIAL

## 2024-01-29 NOTE — TELEPHONE ENCOUNTER
Caller: Nadya Cisneros    Relationship: Self    Best call back number: [phone]    What is the best time to reach you:     ANY    Who are you requesting to speak with (clinical staff, provider,  specific staff member):     JUAN CAUSEY OR TERI      What was the call regarding:     LMP - UNSURE, MAYBE AROUND 12/11/2023    PT HAVE BEEN BLEEDING  LIGHTLY FOR 3 WEEKS    PT STATED JUAN HAS BEEN IN CONTACT AND WANTS HER TO BE SEEN ASAP    PLEASE CALL PT TO SCHEDULE

## 2024-01-30 PROBLEM — Z98.891 HISTORY OF CESAREAN SECTION: Status: ACTIVE | Noted: 2024-01-30

## 2024-01-30 LAB — HCG INTACT+B SERPL-ACNC: NORMAL MIU/ML

## 2024-01-31 ENCOUNTER — INITIAL PRENATAL (OUTPATIENT)
Dept: OBSTETRICS AND GYNECOLOGY | Facility: CLINIC | Age: 34
End: 2024-01-31
Payer: COMMERCIAL

## 2024-01-31 VITALS — DIASTOLIC BLOOD PRESSURE: 60 MMHG | SYSTOLIC BLOOD PRESSURE: 102 MMHG | WEIGHT: 122 LBS | BODY MASS INDEX: 20.94 KG/M2

## 2024-01-31 DIAGNOSIS — F41.1 GENERALIZED ANXIETY DISORDER: ICD-10-CM

## 2024-01-31 DIAGNOSIS — Z98.891 HISTORY OF CESAREAN SECTION: ICD-10-CM

## 2024-01-31 DIAGNOSIS — Z3A.01 7 WEEKS GESTATION OF PREGNANCY: ICD-10-CM

## 2024-01-31 DIAGNOSIS — O09.90 SUPERVISION OF HIGH RISK PREGNANCY, ANTEPARTUM: Primary | ICD-10-CM

## 2024-01-31 LAB
AMPHET+METHAMPHET UR QL: NEGATIVE
AMPHETAMINES UR QL: NEGATIVE
BARBITURATES UR QL SCN: NEGATIVE
BENZODIAZ UR QL SCN: POSITIVE
BILIRUB UR QL STRIP: NEGATIVE
BUPRENORPHINE SERPL-MCNC: NEGATIVE NG/ML
CANNABINOIDS SERPL QL: NEGATIVE
CLARITY UR: ABNORMAL
COCAINE UR QL: NEGATIVE
COLOR UR: YELLOW
FENTANYL UR-MCNC: NEGATIVE NG/ML
GLUCOSE UR STRIP-MCNC: NEGATIVE MG/DL
HGB UR QL STRIP.AUTO: NEGATIVE
HOLD SPECIMEN: NORMAL
KETONES UR QL STRIP: ABNORMAL
LEUKOCYTE ESTERASE UR QL STRIP.AUTO: NEGATIVE
METHADONE UR QL SCN: NEGATIVE
NITRITE UR QL STRIP: NEGATIVE
OPIATES UR QL: NEGATIVE
OXYCODONE UR QL SCN: NEGATIVE
PCP UR QL SCN: NEGATIVE
PH UR STRIP.AUTO: 7.5 [PH] (ref 5–8)
PROT UR QL STRIP: ABNORMAL
SP GR UR STRIP: 1.03 (ref 1–1.03)
TRICYCLICS UR QL SCN: NEGATIVE
UROBILINOGEN UR QL STRIP: ABNORMAL

## 2024-01-31 PROCEDURE — 80307 DRUG TEST PRSMV CHEM ANLYZR: CPT | Performed by: OBSTETRICS & GYNECOLOGY

## 2024-01-31 PROCEDURE — G0480 DRUG TEST DEF 1-7 CLASSES: HCPCS | Performed by: OBSTETRICS & GYNECOLOGY

## 2024-01-31 PROCEDURE — 81003 URINALYSIS AUTO W/O SCOPE: CPT | Performed by: OBSTETRICS & GYNECOLOGY

## 2024-01-31 RX ORDER — ONDANSETRON 4 MG/1
4 TABLET, ORALLY DISINTEGRATING ORAL EVERY 8 HOURS PRN
Qty: 30 TABLET | Refills: 2 | Status: SHIPPED | OUTPATIENT
Start: 2024-01-31

## 2024-01-31 RX ORDER — FLUOXETINE HYDROCHLORIDE 20 MG/1
CAPSULE ORAL
COMMUNITY
Start: 2023-05-30

## 2024-01-31 NOTE — PROGRESS NOTES
Subjective     Chief Complaint   Patient presents with    Initial Prenatal Visit     NOB c/o nausea request ute Cisneros is a 33 y.o. year old .  Patient's last menstrual period was 2023..  She presents to be seen to initiate prenatal care with our practice. Spontaneous, unplanned, but welcomed pregnancy.    She is currently having problems with nausea. Vaginal bleeding and cramping. None now since   As it relates to the pregnancy, she has concerns regarding Management of High Risk Pregnancy:    Hx of  section  Failed induction at 42 weeks in the context of IVF pregnancy  Late PP D&C for retained placental fragment  Will need to review records  Plans repeat     Affective Disorder  In remission on High Risk medications  Followed by Psychiatrist  Advised to continue present regimen  Risk benefits reviewed    Nausea in pregnancy  Will prescribe antiemetic      The following portions of the patient's history were reviewed and updated as appropriate:vital signs, allergies, current medications, past medical history, past social history, past surgical history, and problem list.    Review of Systems - Negative except HPI    Objective     Physical Exam  General:  well developed; well nourished  no acute distress   Constitutional: thin and healthy   Skin:  No suspicious lesions seen   Thyroid: normal to inspection and palpation   Lungs:  breathing is unlabored  clear to auscultation bilaterally   Heart:  regular rate and rhythm, S1, S2 normal, no murmur, click, rub or gallop   Breasts:  Not performed.   Abdomen: soft, non-tender; no masses  no umbilical or inginual hernias are present  no hepato-splenomegaly  incision is clean, dry, intact, without drainage, and hypertrophic   Pelvis: Clinical staff was present for exam  External genitalia:  normal appearance of the external genitalia including Bartholin's and [place]'s glands.  :  urethral meatus normal; urethral hypermobility is  absent.  Vaginal:  normal pink mucosa without prolapse or lesions. discharge present -  mucousy, white, and routine cultures obtained;  Cervix:  normal appearance pap performed  Uterus:  symmetrically enlarged, consisent with 6-8 weeks size;  Adnexa:  normal bimanual exam of the adnexa.   Musculoskeletal: negative   Neuro: normal without focal findings, mental status, speech normal, alert and oriented x3, and RANDAL   Psych: oriented to time, place and person, mood and affect are within normal limits, pt is a good historian; no memory problems were noted       Lab Review   No data reviewed    Imaging   Pelvic ultrasound images independantly reviewed - CWD    Assessment & Plan     ASSESSMENT  IUP at 7w2d  Threatened  resolving  Hx of  section Need to review records plans elective repeat  Affective disorder in remission on high risk medication with established mental health provider  Nausea     PLAN  Tests ordered today:  Orders Placed This Encounter   Procedures    Gardnerella vaginalis, Trichomonas vaginalis, Candida albicans, DNA - Swab, Vagina     Standing Status:   Future     Number of Occurrences:   1     Standing Expiration Date:   2025     Order Specific Question:   Release to patient     Answer:   Routine Release [[id number]]    [facility]     Standing Status:   Future     Standing Expiration Date:   2025     Order Specific Question:   Reason for Exam:     Answer:   screen     Order Specific Question:   Release to patient     Answer:   Routine Release [[id number]]     Ob Transvaginal     Standing Status:   Future     Standing Expiration Date:   2025     Order Specific Question:   Reason for Exam:     Answer:   follow up threatened      Order Specific Question:   Release to patient     Answer:   Routine Release [[id number]]    Urinalysis With Culture If Indicated - Urine, Clean Catch     Standing Status:   Future     Standing Expiration Date:    1/30/2025     Order Specific Question:   Release to patient     Answer:   Routine Release [[id number]]    HIV-1 / O / 2 Ag / Antibody     Standing Status:   Future     Standing Expiration Date:   1/29/2025     Order Specific Question:   Release to patient     Answer:   Routine Release [[id number]]    Urine Drug Screen - Urine, Clean Catch     Standing Status:   Future     Standing Expiration Date:   1/30/2025     Order Specific Question:   Release to patient     Answer:   Routine Release [[id number]]    Obstetric Panel     Standing Status:   Future     Standing Expiration Date:   1/29/2025     Order Specific Question:   Release to patient     Answer:   Routine Release [[id number]]    Hemoglobin A1c     Standing Status:   Future     Standing Expiration Date:   1/29/2025     Order Specific Question:   Release to patient     Answer:   Routine Release [[id number]]    [facility] Singh Prenatal Test: Chromosomes 13, 18, 21, X & Y: Triploidy 22Q.11.2 Deletion - Blood,     ==========Department Information==========    ID: [id number]    [facility]  [address] [place] [id number]  Dept: [phone]  Dept Fax: [phone]  Loc: [phone]  Loc Fax: [phone]       Standing Status:   Future     Standing Expiration Date:   1/31/2025     Order Specific Question:   Is patient pregnant?     Answer:   Yes     Order Specific Question:   Expected due date (MM/DD/YYYY):     Answer:   9/16/2024     Order Specific Question:   Is this a twin pregnancy? (viable, no vanished twin)     Answer:   No     Order Specific Question:   Is this a surrogate or egg donor pregnancy?     Answer:   No     Order Specific Question:   I want fetal sex included in the report:     Answer:   Yes     Order Specific Question:   Patient's weight (lbs):     Answer:   125     Order Specific Question:   Opt out of 22q11.2?     Answer:   No     Order Specific Question:   Enroll this  patient in the Automatic Redraw Program?     Answer:   Yes     Order Specific Question:   What type of billing?     Answer:   Bill Insurance     Order Specific Question:   Did patient sign the Patient Acknowledgement?     Answer:   Yes     Order Specific Question:   Did the ordering clinician sign the Statement of Informed Consent?     Answer:   Yes     Order Specific Question:   Blood draw managed by Reggie?     Answer:   No     Order Specific Question:   Release to patient     Answer:   Routine Release [[id number]]     Medications prescribed today:  New Medications Ordered This Visit   Medications    ondansetron ODT (ZOFRAN-ODT) 4 MG disintegrating tablet     Sig: Place 1 tablet on the tongue Every 8 (Eight) Hours As Needed for Nausea or Vomiting.     Dispense:  30 tablet     Refill:  2     Information reviewed: exercise in pregnancy, nutrition in pregnancy, weight gain in pregnancy, work and travel restrictions during pregnancy, list of OTC medications acceptable in pregnancy, and call coverage groups  Genetic testing reviewed: NIPT (Panorama)  The problem list for pregnancy was initiated today  Repeat TVS and routine labs plus NIPT on return      Follow up: 3 week(s)             Ed Garcia MD  January 31, 2024

## 2024-02-02 LAB — REF LAB TEST METHOD: NORMAL

## 2024-02-06 LAB
ALPRAZ UR QL: NEGATIVE
BENZODIAZ UR QL: NEGATIVE NG/ML
CLONAZEPAM UR QL: NEGATIVE
FLURAZEPAM UR QL: NEGATIVE
LABORATORY COMMENT REPORT: NORMAL
LORAZEPAM UR QL: NEGATIVE
MIDAZOLAM UR QL CFM: NEGATIVE
NORDIAZEPAM UR QL: NEGATIVE
OXAZEPAM UR QL: NEGATIVE
TEMAZEPAM UR QL CFM: NEGATIVE
TRIAZOLAM UR QL: NEGATIVE

## 2024-02-16 RX ORDER — PROMETHAZINE HYDROCHLORIDE 25 MG/1
12.5 TABLET ORAL EVERY 6 HOURS PRN
Qty: 30 TABLET | Refills: 2 | Status: SHIPPED | OUTPATIENT
Start: 2024-02-16

## 2024-02-20 ENCOUNTER — LAB (OUTPATIENT)
Dept: LAB | Facility: HOSPITAL | Age: 34
End: 2024-02-20
Payer: COMMERCIAL

## 2024-02-20 ENCOUNTER — ROUTINE PRENATAL (OUTPATIENT)
Dept: OBSTETRICS AND GYNECOLOGY | Facility: CLINIC | Age: 34
End: 2024-02-20
Payer: COMMERCIAL

## 2024-02-20 VITALS — DIASTOLIC BLOOD PRESSURE: 60 MMHG | SYSTOLIC BLOOD PRESSURE: 110 MMHG | WEIGHT: 127 LBS | BODY MASS INDEX: 21.8 KG/M2

## 2024-02-20 DIAGNOSIS — Z98.891 HISTORY OF CESAREAN SECTION: ICD-10-CM

## 2024-02-20 DIAGNOSIS — O09.90 SUPERVISION OF HIGH RISK PREGNANCY, ANTEPARTUM: ICD-10-CM

## 2024-02-20 DIAGNOSIS — F41.1 GENERALIZED ANXIETY DISORDER: ICD-10-CM

## 2024-02-20 DIAGNOSIS — Z3A.10 10 WEEKS GESTATION OF PREGNANCY: Primary | ICD-10-CM

## 2024-02-20 LAB
ABO GROUP BLD: NORMAL
BASOPHILS # BLD AUTO: 0.06 10*3/MM3 (ref 0–0.2)
BASOPHILS NFR BLD AUTO: 0.5 % (ref 0–1.5)
BLD GP AB SCN SERPL QL: NEGATIVE
DEPRECATED RDW RBC AUTO: 41.8 FL (ref 37–54)
EOSINOPHIL # BLD AUTO: 0.21 10*3/MM3 (ref 0–0.4)
EOSINOPHIL NFR BLD AUTO: 1.8 % (ref 0.3–6.2)
ERYTHROCYTE [DISTWIDTH] IN BLOOD BY AUTOMATED COUNT: 12.9 % (ref 12.3–15.4)
HCT VFR BLD AUTO: 35.6 % (ref 34–46.6)
HGB BLD-MCNC: 12.3 G/DL (ref 12–15.9)
IMM GRANULOCYTES # BLD AUTO: 0.05 10*3/MM3 (ref 0–0.05)
IMM GRANULOCYTES NFR BLD AUTO: 0.4 % (ref 0–0.5)
LYMPHOCYTES # BLD AUTO: 1.05 10*3/MM3 (ref 0.7–3.1)
LYMPHOCYTES NFR BLD AUTO: 9 % (ref 19.6–45.3)
MCH RBC QN AUTO: 30.9 PG (ref 26.6–33)
MCHC RBC AUTO-ENTMCNC: 34.6 G/DL (ref 31.5–35.7)
MCV RBC AUTO: 89.4 FL (ref 79–97)
MONOCYTES # BLD AUTO: 0.76 10*3/MM3 (ref 0.1–0.9)
MONOCYTES NFR BLD AUTO: 6.5 % (ref 5–12)
NEUTROPHILS NFR BLD AUTO: 81.8 % (ref 42.7–76)
NEUTROPHILS NFR BLD AUTO: 9.57 10*3/MM3 (ref 1.7–7)
NRBC BLD AUTO-RTO: 0 /100 WBC (ref 0–0.2)
PLATELET # BLD AUTO: 293 10*3/MM3 (ref 140–450)
PMV BLD AUTO: 10.4 FL (ref 6–12)
RBC # BLD AUTO: 3.98 10*6/MM3 (ref 3.77–5.28)
RH BLD: POSITIVE
WBC NRBC COR # BLD AUTO: 11.7 10*3/MM3 (ref 3.4–10.8)

## 2024-02-20 PROCEDURE — 0502F SUBSEQUENT PRENATAL CARE: CPT | Performed by: NURSE PRACTITIONER

## 2024-02-20 PROCEDURE — 83036 HEMOGLOBIN GLYCOSYLATED A1C: CPT

## 2024-02-20 PROCEDURE — 80081 OBSTETRIC PANEL INC HIV TSTG: CPT

## 2024-02-20 PROCEDURE — 86803 HEPATITIS C AB TEST: CPT

## 2024-02-20 NOTE — PROGRESS NOTES
Chief Complaint   Patient presents with    Routine Prenatal Visit     Ob f/up after US       HPI: Nadya is a  currently at 10w1d who today reports the following: She continues to have nausea, mostly manageable with zofran during the day and phenergan at night. She is currently having some sinus symptoms (her 15 month old was sick over the weekend)   Nausea - YES; Vaginal bleeding -  No; Heartburn - No.    ROS:  GI: Constipation - No; Diarrhea - No    Neuro: Headache - No; Visual change - No      EXAM:  Vitals: See prenatal flowsheet   Abdomen: See prenatal flowsheet   Urine glucose/protein: See prenatal flowsheet   Pelvic: See prenatal flowsheet     Prenatal Labs  Lab Results   Component Value Date    HGB 12.5 2022    RUBELLAABIGG 7.01 2020    HEPBSAG Nonreactive 2022    ABORH O Positive 2022    ABSCRN Negative 2022    GYM2RUE8 Nonreactive 2022    HEPCVIRUSABY Nonreactive 2022    URINECX Final report 2020    CHLAMNAA Negative 11/10/2019    NGONORRHON Negative 11/10/2019       MDM:  Impression: Iup at 10 weeks 1/7   Hx of  section Need to review records plans elective repeat  Affective disorder in remission on high risk medication with established mental health provider  Nausea    Tests done today: U/S for fhr- 175, good interval growth noted   Nipt - o with fetal sex and genetic information results to be released over the phone   Topics discussed: Continue with PNV's  Prenatal labs reviewed  Nausea, vomiting and bleeding precautions reviewed, she has refills available at pharmacy for her antiemetics    Tests scheduled today for her next visit:   none

## 2024-02-21 LAB
HBA1C MFR BLD: 5.5 % (ref 4.8–5.6)
HBV SURFACE AG SERPL QL IA: NORMAL
HCV AB SER DONR QL: NORMAL
HIV 1+2 AB+HIV1 P24 AG SERPL QL IA: NORMAL
RPR SER QL: NORMAL

## 2024-02-22 LAB — RUBV IGG SERPL IA-ACNC: 4.31 INDEX

## 2024-03-26 ENCOUNTER — ROUTINE PRENATAL (OUTPATIENT)
Dept: OBSTETRICS AND GYNECOLOGY | Facility: CLINIC | Age: 34
End: 2024-03-26
Payer: COMMERCIAL

## 2024-03-26 VITALS — WEIGHT: 133 LBS | DIASTOLIC BLOOD PRESSURE: 70 MMHG | BODY MASS INDEX: 22.83 KG/M2 | SYSTOLIC BLOOD PRESSURE: 110 MMHG

## 2024-03-26 DIAGNOSIS — O09.90 SUPERVISION OF HIGH RISK PREGNANCY, ANTEPARTUM: ICD-10-CM

## 2024-03-26 DIAGNOSIS — Z3A.15 15 WEEKS GESTATION OF PREGNANCY: Primary | ICD-10-CM

## 2024-03-26 DIAGNOSIS — Z98.891 HISTORY OF CESAREAN SECTION: ICD-10-CM

## 2024-03-26 PROCEDURE — 0502F SUBSEQUENT PRENATAL CARE: CPT | Performed by: NURSE PRACTITIONER

## 2024-03-26 RX ORDER — BUSPIRONE HYDROCHLORIDE 5 MG/1
TABLET ORAL
COMMUNITY
Start: 2024-02-21

## 2024-03-26 NOTE — PROGRESS NOTES
Chief Complaint   Patient presents with    Routine Prenatal Visit     No c/c       HPI: Nadya is a  currently at 15w1d who today reports the following:  Nausea - improved as compared to the prior visit; Vaginal bleeding -  No; Heartburn - No.    ROS:  GI: Constipation - No; Diarrhea - No    Neuro: Headache - No; Visual change - No      EXAM:  Vitals: See prenatal flowsheet   Abdomen: See prenatal flowsheet   Urine glucose/protein: See prenatal flowsheet   Pelvic: See prenatal flowsheet     Prenatal Labs  Lab Results   Component Value Date    HGB 12.3 2024    RUBELLAABIGG 4.31 2024    HEPBSAG Non-Reactive 2024    ABORH O Positive 2022    ABSCRN Negative 2024    [id number] Non-Reactive 2024    HEPCVIRUSABY Non-Reactive 2024    URINECX Final report 2020    CHLAMNAA Negative 11/10/2019    NGONORRHON Negative 11/10/2019       MDM:  Impression: Supervision of high risk pregnancy  Previous C/S with planned repeat C/S  Low risk nipt-boy   Tests done today: none   Topics discussed: Continue with PNV's  Prenatal labs reviewed   labor signs and symptoms  Nausea, vomiting, and bleeding precautions reviewed    Tests scheduled today for her next visit:   U/S for anatomic screening at [facility]

## 2024-05-01 ENCOUNTER — PREP FOR SURGERY (OUTPATIENT)
Dept: OTHER | Facility: HOSPITAL | Age: 34
End: 2024-05-01
Payer: COMMERCIAL

## 2024-05-01 ENCOUNTER — OFFICE VISIT (OUTPATIENT)
Dept: OBSTETRICS AND GYNECOLOGY | Facility: HOSPITAL | Age: 34
End: 2024-05-01
Payer: COMMERCIAL

## 2024-05-01 ENCOUNTER — ROUTINE PRENATAL (OUTPATIENT)
Dept: OBSTETRICS AND GYNECOLOGY | Facility: CLINIC | Age: 34
End: 2024-05-01
Payer: COMMERCIAL

## 2024-05-01 ENCOUNTER — HOSPITAL ENCOUNTER (OUTPATIENT)
Dept: WOMENS IMAGING | Facility: HOSPITAL | Age: 34
Discharge: HOME | End: 2024-05-01
Admitting: OBSTETRICS & GYNECOLOGY
Payer: COMMERCIAL

## 2024-05-01 VITALS — WEIGHT: 135 LBS | DIASTOLIC BLOOD PRESSURE: 65 MMHG | BODY MASS INDEX: 23.17 KG/M2 | SYSTOLIC BLOOD PRESSURE: 106 MMHG

## 2024-05-01 VITALS — DIASTOLIC BLOOD PRESSURE: 65 MMHG | SYSTOLIC BLOOD PRESSURE: 106 MMHG | BODY MASS INDEX: 23.17 KG/M2 | WEIGHT: 135 LBS

## 2024-05-01 DIAGNOSIS — F41.1 GENERALIZED ANXIETY DISORDER: ICD-10-CM

## 2024-05-01 DIAGNOSIS — O09.292 HISTORY OF PRE-ECLAMPSIA IN PRIOR PREGNANCY, CURRENTLY PREGNANT IN SECOND TRIMESTER: Primary | ICD-10-CM

## 2024-05-01 DIAGNOSIS — O09.292 HISTORY OF PRE-ECLAMPSIA IN PRIOR PREGNANCY, CURRENTLY PREGNANT IN SECOND TRIMESTER: ICD-10-CM

## 2024-05-01 DIAGNOSIS — O09.90 SUPERVISION OF HIGH RISK PREGNANCY, ANTEPARTUM: ICD-10-CM

## 2024-05-01 DIAGNOSIS — Z98.891 HISTORY OF CESAREAN SECTION: ICD-10-CM

## 2024-05-01 DIAGNOSIS — Z3A.20 20 WEEKS GESTATION OF PREGNANCY: Primary | ICD-10-CM

## 2024-05-01 DIAGNOSIS — Z98.891 HISTORY OF CESAREAN SECTION: Primary | ICD-10-CM

## 2024-05-01 PROCEDURE — 76811 OB US DETAILED SNGL FETUS: CPT

## 2024-05-01 RX ORDER — BUPIVACAINE HCL/0.9 % NACL/PF 0.1 %
2000 PLASTIC BAG, INJECTION (ML) EPIDURAL ONCE
OUTPATIENT
Start: 2024-05-01 | End: 2024-05-01

## 2024-05-01 RX ORDER — OXYTOCIN/0.9 % SODIUM CHLORIDE 30/500 ML
85 PLASTIC BAG, INJECTION (ML) INTRAVENOUS ONCE
OUTPATIENT
Start: 2024-05-01

## 2024-05-01 RX ORDER — KETOROLAC TROMETHAMINE 30 MG/ML
30 INJECTION, SOLUTION INTRAMUSCULAR; INTRAVENOUS ONCE
OUTPATIENT
Start: 2024-05-01 | End: 2024-05-01

## 2024-05-01 RX ORDER — CARBOPROST TROMETHAMINE 250 UG/ML
250 INJECTION, SOLUTION INTRAMUSCULAR
OUTPATIENT
Start: 2024-05-01

## 2024-05-01 RX ORDER — OXYTOCIN/0.9 % SODIUM CHLORIDE 30/500 ML
650 PLASTIC BAG, INJECTION (ML) INTRAVENOUS ONCE
OUTPATIENT
Start: 2024-05-01

## 2024-05-01 RX ORDER — SODIUM CHLORIDE 0.9 % (FLUSH) 0.9 %
10 SYRINGE (ML) INJECTION EVERY 12 HOURS SCHEDULED
OUTPATIENT
Start: 2024-05-01

## 2024-05-01 RX ORDER — LIDOCAINE HYDROCHLORIDE 10 MG/ML
0.5 INJECTION, SOLUTION EPIDURAL; INFILTRATION; INTRACAUDAL; PERINEURAL ONCE AS NEEDED
OUTPATIENT
Start: 2024-05-01

## 2024-05-01 RX ORDER — ASPIRIN 81 MG/1
81 TABLET ORAL DAILY
Qty: 100 TABLET | Refills: 0 | Status: SHIPPED | OUTPATIENT
Start: 2024-05-01

## 2024-05-01 RX ORDER — SODIUM CHLORIDE, SODIUM LACTATE, POTASSIUM CHLORIDE, CALCIUM CHLORIDE 600; 310; 30; 20 MG/100ML; MG/100ML; MG/100ML; MG/100ML
125 INJECTION, SOLUTION INTRAVENOUS CONTINUOUS
OUTPATIENT
Start: 2024-05-01

## 2024-05-01 RX ORDER — METHYLERGONOVINE MALEATE 0.2 MG/ML
200 INJECTION INTRAVENOUS ONCE AS NEEDED
OUTPATIENT
Start: 2024-05-01

## 2024-05-01 RX ORDER — CITRIC ACID/SODIUM CITRATE 334-500MG
30 SOLUTION, ORAL ORAL ONCE
OUTPATIENT
Start: 2024-05-01 | End: 2024-05-01

## 2024-05-01 RX ORDER — MISOPROSTOL 200 UG/1
800 TABLET ORAL ONCE AS NEEDED
OUTPATIENT
Start: 2024-05-01

## 2024-05-01 RX ORDER — SODIUM CHLORIDE 9 MG/ML
40 INJECTION, SOLUTION INTRAVENOUS AS NEEDED
OUTPATIENT
Start: 2024-05-01

## 2024-05-01 RX ORDER — ACETAMINOPHEN 500 MG
1000 TABLET ORAL ONCE
OUTPATIENT
Start: 2024-05-01 | End: 2024-05-01

## 2024-05-01 RX ORDER — SODIUM CHLORIDE 0.9 % (FLUSH) 0.9 %
10 SYRINGE (ML) INJECTION AS NEEDED
OUTPATIENT
Start: 2024-05-01

## 2024-05-01 NOTE — ASSESSMENT & PLAN NOTE
Patient with a history of  section and history of a delayed postpartum hemorrhage requiring D&C eventually concerning for morbidly adherent placenta.  Placenta is posterior/fundal today not overlying previous scar.  We discussed that there is a low incidence of needing D&C again in the postpartum period, though patient at a higher risk than other normal pregnancies.  Would recommend careful evaluation of placenta at the time of delivery.

## 2024-05-01 NOTE — LETTER
May 1, 2024       No Recipients    Patient: Nadya Cisneros   YOB: 1990   Date of Visit: 2024       Dear Ed Garcia MD,    Thank you for referring Nadya Cisneros to me for evaluation. Below is a copy of my consult note.    If you have questions, please do not hesitate to call me. I look forward to following Nadya along with you.         Sincerely,        Tien Yepez MD        CC:   No Recipients    Patient has an appointment with Dr. Garcia today. NIPT low risk. Patient denies vaginal bleeding or complaints. Previous history of IVF pregnancy, preeclampsia, C/S, and placenta accreta with postpartum D&C.        Maternal/Fetal Medicine Consult Note   Date: 2024  Name: Nadya Cisneros    : 1990     MRN: [id number]     Referring Provider: Ed Garcia MD    Chief Complaint  hx preeclampsia    Subjective     History of Present Illness:  Nadya Cisneros is a 33 y.o.  20w2d who presents today for history of preeclampsia and history of postpartum hemorrhage requiring D&C    ARNOLDO: Estimated Date of Delivery: 24     ROS:   Otherwise Noted in HPI    Past Medical History:   Diagnosis Date   • ADD (attention deficit disorder)    • Anxiety     buspar and prozac   • History of infertility    • History of placenta accreta     with postpartum D&C   • Preeclampsia     G2   • UTI (urinary tract infection)       Past Surgical History:   Procedure Laterality Date   • BREAST SURGERY      IMPLANTS   •  SECTION  2022   • DILATATION AND CURETTAGE N/A     Placenta Accreta postpartum D&C      OB History          3    Para   1    Term   1            AB   1    Living   1         SAB   1    IAB        Ectopic        Molar        Multiple        Live Births   1          Obstetric Comments   Fob#1 pregnancy #1-3    G2: preeclampsia;  placenta accreta C/S with D&C, IVF               Current Outpatient Medications:   •  busPIRone (BUSPAR) 5 MG tablet,  ", Disp: , Rfl:   •  FLUoxetine (PROzac) 20 MG capsule, , Disp: , Rfl:   •  Prenatal MV & Min w/FA-DHA (ONE A DAY PRENATAL PO), Take  by mouth., Disp: , Rfl:   •  Probiotic Product (PROBIOTIC-10 PO), Take  by mouth., Disp: , Rfl:   •  ondansetron ODT (ZOFRAN-ODT) 4 MG disintegrating tablet, Place 1 tablet on the tongue Every 8 (Eight) Hours As Needed for Nausea or Vomiting. (Patient not taking: Reported on 2024), Disp: 30 tablet, Rfl: 2  •  promethazine (PHENERGAN) 25 MG tablet, Take 0.5 tablets by mouth Every 6 (Six) Hours As Needed for Nausea or Vomiting. (Patient not taking: Reported on 2024), Disp: 30 tablet, Rfl: 2    Objective     Vital Signs  /65   Wt 61.2 kg (135 lb)   LMP 2023   Estimated body mass index is 23.17 kg/m² as calculated from the following:    Height as of 3/21/23: 162.6 cm (64\").    Weight as of this encounter: 61.2 kg (135 lb).    Ultrasound Impression:   See Viewpoint     Assessment and Plan     Nadya Cisneros is a 33 y.o.  20w2d who presents today for history of preeclampsia and history of postpartum hemorrhage requiring D&C with concern for retained placenta    Diagnoses and all orders for this visit:    1. History of pre-eclampsia in prior pregnancy, currently pregnant in second trimester (Primary)  Assessment & Plan:  Women with a history of previous preeclampsia are at increased risk of preeclampsia and other adverse pregnancy outcomes in subsequent pregnancies. The magnitude of this risk is dependent on gestational age at time of disease onset, severity of disease and the presence or absence of preexisting medical disorders. The objective in the management of these patients is to reduce risk factors by optimizing maternal health before conception and to detect obstetric complications as early as possible. This objective can be achieved by formulating a rational approach that includes preconception evaluation and counseling, early  care, frequent " monitoring of maternal and fetal well-being and timely delivery.     Recent studies have confirmed that there is no single biomarker that can be clinically useful for the prediction of recurrent preeclampsia. Combinations of biomarkers and biophysical parameters appear promising but more data are needed to confirm this use in clinical practice.  Further, a committee opinion published by the American College of Obstetricians and Gynecologists in September 2015 noted 1st trimester screening tests for preeclampsia have low positive predictive value and there are no data demonstrating that they lead of improved outcomes and are therefore not suggested. Supplementation with fish oil, calcium or vitamin C and E and the use of antihypertensives have been shown to be ineffective in the prevention of recurrent preeclampsia and are not recommended.     In 2014, the USPSTF (U.S. Preventive Services Task Force) recommended low-dose aspirin after 12 weeks' gestation for pregnant women at high risk for preeclampsia (Karin Intern Med 2014; 161:819).  In a 2021 update, researchers incorporated data from several newer trials, including the large, multicenter ASPRE trial (N Engl J Med 2017; 377:613).   The Task Force continues to recommend daily low-dose aspirin (81 mg/day) after 12 weeks' gestation as a preventive measure for women at high risk for preeclampsia.  (B recommendation; moderate-certainty evidence of substantial net benefit).  As data on optimal risk prediction for preeclampsia are lacking, the USPSTF again offers a pragmatic approach to identifying at-risk individuals.  It recommends treatment for women with one or more high-risk factors or two or more moderate-risk factors.      High-risk factors:  History of preeclampsia, multifetal gestation, chronic hypertension, pregestational (type 1 or type 2) diabetes, kidney disease, or autoimmune disease (e.g., systemic lupus erythematous, antiphospholipid syndrome).       Moderate-risk factors:  Nulliparity, body-mass index =30 kg/m2, family history (i.e., preeclampsia in mother or sister), age =35, in vitro conception, personal history factors (i.e., previous adverse pregnancy outcome or intrauterine growth restriction =10-year pregnancy interval), lower socioeconomic status, or Black race (due to a confluence of environmental, social and historical inequities rather than biology).      Although the 2014 USPSTF recommendations are unchanged, pooling of additional data for the meta-analysis strengthens evidence of safety as well as efficacy (lower risk for preeclampsia,  birth and fetal growth restriction) of low-dose aspirin.  Lastly, the meta-analysis further validates the association between low-dose aspirin and lower  mortality (relative risk reduction, 21%).      I recommend more frequent monitoring for signs and symptoms of severe hypertension or preeclampsia than that recommended for normal pregnancy. This monitoring may include more frequent prenatal visits, home blood pressure monitoring or nursing contacts. For patients with a prior pregnancy complicated by preeclampsia with fetal growth restriction, I recommend serial ultrasound evaluation of fetal growth and amniotic fluid volume. The development of severe gestational hypertension, fetal growth restriction or recurrent preeclampsia requires maternal hospitalization.     Would recommend aspirin supplementation for preeclampsia prevention and would also recommend baseline 24-hour urine and baseline preeclampsia labs.        2. History of  section  Assessment & Plan:  Patient with a history of  section and history of a delayed postpartum hemorrhage requiring D&C eventually concerning for morbidly adherent placenta.  Placenta is posterior/fundal today not overlying previous scar.  We discussed that there is a low incidence of needing D&C again in the postpartum period, though patient at a  higher risk than other normal pregnancies.  Would recommend careful evaluation of placenta at the time of delivery.           Follow Up  Follow-up at 32 weeks    I spent 15 minutes caring for the patient on the day of service. This included: obtaining or reviewing a separately obtained medical history, reviewing patient records, performing a medically appropriate exam and/or evaluation, counseling or educating the patient/family/caregiver, ordering medications, labs, and/or procedures and documenting such in the medical record. This does not include time spent on review and interpretation of other tests such as fetal ultrasound or the performance of other procedures such as amniocentesis or CVS.      Tien Yepez MD, FACOG  Maternal Fetal Medicine, [facility] [place]

## 2024-05-01 NOTE — PROGRESS NOTES
Maternal/Fetal Medicine Consult Note   Date: 2024  Name: Nadya Cisneros    : 1990     MRN: [id number]     Referring Provider: Ed Garcia MD    Chief Complaint  hx preeclampsia    Subjective     History of Present Illness:  Nadya Cisneros is a 33 y.o.  20w2d who presents today for history of preeclampsia and history of postpartum hemorrhage requiring D&C    ARNOLDO: Estimated Date of Delivery: 24     ROS:   Otherwise Noted in HPI    Past Medical History:   Diagnosis Date    ADD (attention deficit disorder)     Anxiety     buspar and prozac    History of infertility     History of placenta accreta     with postpartum D&C    Preeclampsia     G2    UTI (urinary tract infection)       Past Surgical History:   Procedure Laterality Date    BREAST SURGERY      IMPLANTS     SECTION  2022    DILATATION AND CURETTAGE N/A     Placenta Accreta postpartum D&C      OB History          3    Para   1    Term   1            AB   1    Living   1         SAB   1    IAB        Ectopic        Molar        Multiple        Live Births   1          Obstetric Comments   Fob#1 pregnancy #1-3    G2: preeclampsia;  placenta accreta C/S with D&C, IVF               Current Outpatient Medications:     busPIRone (BUSPAR) 5 MG tablet, , Disp: , Rfl:     FLUoxetine (PROzac) 20 MG capsule, , Disp: , Rfl:     Prenatal MV & Min w/FA-DHA (ONE A DAY PRENATAL PO), Take  by mouth., Disp: , Rfl:     Probiotic Product (PROBIOTIC-10 PO), Take  by mouth., Disp: , Rfl:     ondansetron ODT (ZOFRAN-ODT) 4 MG disintegrating tablet, Place 1 tablet on the tongue Every 8 (Eight) Hours As Needed for Nausea or Vomiting. (Patient not taking: Reported on 2024), Disp: 30 tablet, Rfl: 2    promethazine (PHENERGAN) 25 MG tablet, Take 0.5 tablets by mouth Every 6 (Six) Hours As Needed for Nausea or Vomiting. (Patient not taking: Reported on 2024), Disp: 30 tablet, Rfl: 2    Objective     Vital  "Signs  /65   Wt 61.2 kg (135 lb)   LMP 2023   Estimated body mass index is 23.17 kg/m² as calculated from the following:    Height as of 3/21/23: 162.6 cm (64\").    Weight as of this encounter: 61.2 kg (135 lb).    Ultrasound Impression:   See Viewpoint     Assessment and Plan     Nadya Cisneros is a 33 y.o.  20w2d who presents today for history of preeclampsia and history of postpartum hemorrhage requiring D&C with concern for retained placenta    Diagnoses and all orders for this visit:    1. History of pre-eclampsia in prior pregnancy, currently pregnant in second trimester (Primary)  Assessment & Plan:  Women with a history of previous preeclampsia are at increased risk of preeclampsia and other adverse pregnancy outcomes in subsequent pregnancies. The magnitude of this risk is dependent on gestational age at time of disease onset, severity of disease and the presence or absence of preexisting medical disorders. The objective in the management of these patients is to reduce risk factors by optimizing maternal health before conception and to detect obstetric complications as early as possible. This objective can be achieved by formulating a rational approach that includes preconception evaluation and counseling, early  care, frequent monitoring of maternal and fetal well-being and timely delivery.     Recent studies have confirmed that there is no single biomarker that can be clinically useful for the prediction of recurrent preeclampsia. Combinations of biomarkers and biophysical parameters appear promising but more data are needed to confirm this use in clinical practice.  Further, a committee opinion published by the American College of Obstetricians and Gynecologists in 2015 noted 1st trimester screening tests for preeclampsia have low positive predictive value and there are no data demonstrating that they lead of improved outcomes and are therefore not suggested. " Supplementation with fish oil, calcium or vitamin C and E and the use of antihypertensives have been shown to be ineffective in the prevention of recurrent preeclampsia and are not recommended.     In 2014, the USPSTF (U.S. Preventive Services Task Force) recommended low-dose aspirin after 12 weeks' gestation for pregnant women at high risk for preeclampsia (Karin Intern Med 2014; 161:819).  In a  update, researchers incorporated data from several newer trials, including the large, multicenter ASPRE trial (N Engl J Med 2017; 377:613).   The Task Force continues to recommend daily low-dose aspirin (81 mg/day) after 12 weeks' gestation as a preventive measure for women at high risk for preeclampsia.  (B recommendation; moderate-certainty evidence of substantial net benefit).  As data on optimal risk prediction for preeclampsia are lacking, the USPSTF again offers a pragmatic approach to identifying at-risk individuals.  It recommends treatment for women with one or more high-risk factors or two or more moderate-risk factors.      High-risk factors:  History of preeclampsia, multifetal gestation, chronic hypertension, pregestational (type 1 or type 2) diabetes, kidney disease, or autoimmune disease (e.g., systemic lupus erythematous, antiphospholipid syndrome).      Moderate-risk factors:  Nulliparity, body-mass index ?30 kg/m2, family history (i.e., preeclampsia in mother or sister), age ?35, in vitro conception, personal history factors (i.e., previous adverse pregnancy outcome or intrauterine growth restriction ?10-year pregnancy interval), lower socioeconomic status, or Black race (due to a confluence of environmental, social and historical inequities rather than biology).      Although the 2014 USPSTF recommendations are unchanged, pooling of additional data for the meta-analysis strengthens evidence of safety as well as efficacy (lower risk for preeclampsia,  birth and fetal growth restriction) of  low-dose aspirin.  Lastly, the meta-analysis further validates the association between low-dose aspirin and lower  mortality (relative risk reduction, 21%).      I recommend more frequent monitoring for signs and symptoms of severe hypertension or preeclampsia than that recommended for normal pregnancy. This monitoring may include more frequent prenatal visits, home blood pressure monitoring or nursing contacts. For patients with a prior pregnancy complicated by preeclampsia with fetal growth restriction, I recommend serial ultrasound evaluation of fetal growth and amniotic fluid volume. The development of severe gestational hypertension, fetal growth restriction or recurrent preeclampsia requires maternal hospitalization.     Would recommend aspirin supplementation for preeclampsia prevention and would also recommend baseline 24-hour urine and baseline preeclampsia labs.        2. History of  section  Assessment & Plan:  Patient with a history of  section and history of a delayed postpartum hemorrhage requiring D&C eventually concerning for morbidly adherent placenta.  Placenta is posterior/fundal today not overlying previous scar.  We discussed that there is a low incidence of needing D&C again in the postpartum period, though patient at a higher risk than other normal pregnancies.  Would recommend careful evaluation of placenta at the time of delivery.           Follow Up  Follow-up at 32 weeks    I spent 15 minutes caring for the patient on the day of service. This included: obtaining or reviewing a separately obtained medical history, reviewing patient records, performing a medically appropriate exam and/or evaluation, counseling or educating the patient/family/caregiver, ordering medications, labs, and/or procedures and documenting such in the medical record. This does not include time spent on review and interpretation of other tests such as fetal ultrasound or the performance of  other procedures such as amniocentesis or CVS.      Tien Yepez MD, FACOG  Maternal Fetal Medicine, [facility] [place]

## 2024-05-01 NOTE — PROGRESS NOTES
Chief Complaint   Patient presents with    Routine Prenatal Visit     Ob visit     Pregnancy Ultrasound     PDC ultrasound        HPI: Nadya is a  currently at 20w2d who today reports the following:Headache - No ; Visual change - No ; Swelling in legs - No ; Nausea - No ; Constipation - No; Diarrhea - No ; Contractions - No ; Leaking fluid - No ; Vaginal bleeding -  No.      ROS: Pertinent items are noted in HPI.  Vitals: See prenatal flowsheet     EXAM:  Abdomen: See physician component of prenatal flowsheet   Urine glucose/protein: See physician component of prenatal flowsheet   Pelvic: See physician component of prenatal flowsheet     Prenatal Labs  Lab Results   Component Value Date    HGB 12.3 2024    RUBELLAABIGG 4.31 2024    HEPBSAG Non-Reactive 2024    ABORH O Positive 2022    ABO O 2024    RH Positive 2024    ABSCRN Negative 2024    [id number] Non-Reactive 2024    HEPCVIRUSABY Non-Reactive 2024    URINECX Final report 2020       MDM:  Impression:supervision of high risk pregnancy, Previous C/S with planned repeat C/S, and hx of preeclampsia will need to start low doses ASA. Need appropriate records for review  Tests done today: U/S for anatomic screening   Specific topics discussed at today's visit: Birth plan  Symptoms of preeclampsia  New Medications Ordered This Visit   Medications    aspirin 81 MG EC tablet     Sig: Take 1 tablet by mouth Daily.     Dispense:  100 tablet     Refill:  0     Orders Placed This Encounter   Procedures    CBC (No Diff)     Standing Status:   Future     Standing Expiration Date:   2025     Order Specific Question:   Release to patient     Answer:   Routine Release [[id number]]    Glucose, Post 50 Gm Glucola     Standing Status:   Future     Standing Expiration Date:   2025     Order Specific Question:   Release to patient     Answer:   Routine Release [[id number]]       Next visit:GCT and HgB

## 2024-05-01 NOTE — ASSESSMENT & PLAN NOTE
Women with a history of previous preeclampsia are at increased risk of preeclampsia and other adverse pregnancy outcomes in subsequent pregnancies. The magnitude of this risk is dependent on gestational age at time of disease onset, severity of disease and the presence or absence of preexisting medical disorders. The objective in the management of these patients is to reduce risk factors by optimizing maternal health before conception and to detect obstetric complications as early as possible. This objective can be achieved by formulating a rational approach that includes preconception evaluation and counseling, early  care, frequent monitoring of maternal and fetal well-being and timely delivery.     Recent studies have confirmed that there is no single biomarker that can be clinically useful for the prediction of recurrent preeclampsia. Combinations of biomarkers and biophysical parameters appear promising but more data are needed to confirm this use in clinical practice.  Further, a committee opinion published by the American College of Obstetricians and Gynecologists in 2015 noted 1st trimester screening tests for preeclampsia have low positive predictive value and there are no data demonstrating that they lead of improved outcomes and are therefore not suggested. Supplementation with fish oil, calcium or vitamin C and E and the use of antihypertensives have been shown to be ineffective in the prevention of recurrent preeclampsia and are not recommended.     In 2014, the USPSTF (U.S. Preventive Services Task Force) recommended low-dose aspirin after 12 weeks' gestation for pregnant women at high risk for preeclampsia (Karin Intern Med 2014; 161:819).  In a  update, researchers incorporated data from several newer trials, including the large, multicenter ASPRE trial (N Engl J Med 2017; 377:613).   The Task Force continues to recommend daily low-dose aspirin (81 mg/day) after 12 weeks'  gestation as a preventive measure for women at high risk for preeclampsia.  (B recommendation; moderate-certainty evidence of substantial net benefit).  As data on optimal risk prediction for preeclampsia are lacking, the USPSTF again offers a pragmatic approach to identifying at-risk individuals.  It recommends treatment for women with one or more high-risk factors or two or more moderate-risk factors.      High-risk factors:  History of preeclampsia, multifetal gestation, chronic hypertension, pregestational (type 1 or type 2) diabetes, kidney disease, or autoimmune disease (e.g., systemic lupus erythematous, antiphospholipid syndrome).      Moderate-risk factors:  Nulliparity, body-mass index ?30 kg/m2, family history (i.e., preeclampsia in mother or sister), age ?35, in vitro conception, personal history factors (i.e., previous adverse pregnancy outcome or intrauterine growth restriction ?10-year pregnancy interval), lower socioeconomic status, or Black race (due to a confluence of environmental, social and historical inequities rather than biology).      Although the 2014 USPSTF recommendations are unchanged, pooling of additional data for the meta-analysis strengthens evidence of safety as well as efficacy (lower risk for preeclampsia,  birth and fetal growth restriction) of low-dose aspirin.  Lastly, the meta-analysis further validates the association between low-dose aspirin and lower  mortality (relative risk reduction, 21%).      I recommend more frequent monitoring for signs and symptoms of severe hypertension or preeclampsia than that recommended for normal pregnancy. This monitoring may include more frequent prenatal visits, home blood pressure monitoring or nursing contacts. For patients with a prior pregnancy complicated by preeclampsia with fetal growth restriction, I recommend serial ultrasound evaluation of fetal growth and amniotic fluid volume. The development of severe  gestational hypertension, fetal growth restriction or recurrent preeclampsia requires maternal hospitalization.     Would recommend aspirin supplementation for preeclampsia prevention and would also recommend baseline 24-hour urine and baseline preeclampsia labs.

## 2024-05-01 NOTE — PROGRESS NOTES
Patient has an appointment with Dr. Garcia today. NIPT low risk. Patient denies vaginal bleeding or complaints. Previous history of IVF pregnancy, preeclampsia, C/S, and placenta accreta with postpartum D&C.

## 2024-06-04 PROBLEM — Z3A.25 25 WEEKS GESTATION OF PREGNANCY: Status: ACTIVE | Noted: 2024-01-31

## 2024-06-27 ENCOUNTER — ROUTINE PRENATAL (OUTPATIENT)
Dept: OBSTETRICS AND GYNECOLOGY | Facility: CLINIC | Age: 34
End: 2024-06-27
Payer: COMMERCIAL

## 2024-06-27 VITALS — WEIGHT: 155 LBS | DIASTOLIC BLOOD PRESSURE: 66 MMHG | BODY MASS INDEX: 26.61 KG/M2 | SYSTOLIC BLOOD PRESSURE: 110 MMHG

## 2024-06-27 DIAGNOSIS — O09.292 HISTORY OF PRE-ECLAMPSIA IN PRIOR PREGNANCY, CURRENTLY PREGNANT IN SECOND TRIMESTER: ICD-10-CM

## 2024-06-27 DIAGNOSIS — F41.1 GENERALIZED ANXIETY DISORDER: ICD-10-CM

## 2024-06-27 DIAGNOSIS — Z98.891 HISTORY OF CESAREAN SECTION: ICD-10-CM

## 2024-06-27 DIAGNOSIS — O09.90 SUPERVISION OF HIGH RISK PREGNANCY, ANTEPARTUM: ICD-10-CM

## 2024-06-27 DIAGNOSIS — Z3A.28 28 WEEKS GESTATION OF PREGNANCY: Primary | ICD-10-CM

## 2024-06-27 PROCEDURE — 0502F SUBSEQUENT PRENATAL CARE: CPT | Performed by: OBSTETRICS & GYNECOLOGY

## 2024-06-27 NOTE — PROGRESS NOTES
Chief Complaint   Patient presents with    Routine Prenatal Visit     Ob visit        HPI: Nadya is a  currently at 28w3d who today reports the following:Headache - No ; Visual change - No ; Swelling in legs - No ; Nausea - No ; Constipation - No; Diarrhea - No ; Contractions - No ; Leaking fluid - No ; Vaginal bleeding -  No.      ROS: Pertinent items are noted in HPI.  Vitals: See prenatal flowsheet     EXAM:  Abdomen: See physician component of prenatal flowsheet   Urine glucose/protein: See physician component of prenatal flowsheet   Pelvic: See physician component of prenatal flowsheet     Prenatal Labs  Lab Results   Component Value Date    HGB 12.3 2024    RUBELLAABIGG 4.31 2024    HEPBSAG Non-Reactive 2024    ABORH O Positive 2022    ABO O 2024    RH Positive 2024    ABSCRN Negative 2024    [id number] Non-Reactive 2024    HEPCVIRUSABY Non-Reactive 2024    URINECX Final report 2020       MDM:  Impression:supervision of high risk pregnancy and Previous C/S with planned repeat C/S  Tests done today: none  Specific topics discussed at today's visit: Maternal monitoring of fetal movement   labor signs and symptoms  Symptoms of preeclampsia  No orders of the defined types were placed in this encounter.    Next visit:GCT and HgB

## 2024-07-24 ENCOUNTER — HOSPITAL ENCOUNTER (OUTPATIENT)
Dept: WOMENS IMAGING | Facility: HOSPITAL | Age: 34
Discharge: HOME | End: 2024-07-24
Payer: COMMERCIAL

## 2024-07-24 ENCOUNTER — LAB (OUTPATIENT)
Dept: LAB | Facility: HOSPITAL | Age: 34
End: 2024-07-24
Payer: COMMERCIAL

## 2024-07-24 ENCOUNTER — ROUTINE PRENATAL (OUTPATIENT)
Dept: OBSTETRICS AND GYNECOLOGY | Facility: CLINIC | Age: 34
End: 2024-07-24
Payer: COMMERCIAL

## 2024-07-24 ENCOUNTER — OFFICE VISIT (OUTPATIENT)
Dept: OBSTETRICS AND GYNECOLOGY | Facility: HOSPITAL | Age: 34
End: 2024-07-24
Payer: COMMERCIAL

## 2024-07-24 VITALS — BODY MASS INDEX: 27.64 KG/M2 | WEIGHT: 161 LBS | DIASTOLIC BLOOD PRESSURE: 67 MMHG | SYSTOLIC BLOOD PRESSURE: 112 MMHG

## 2024-07-24 VITALS — BODY MASS INDEX: 27.64 KG/M2 | DIASTOLIC BLOOD PRESSURE: 67 MMHG | SYSTOLIC BLOOD PRESSURE: 112 MMHG | WEIGHT: 161 LBS

## 2024-07-24 DIAGNOSIS — Z3A.32 32 WEEKS GESTATION OF PREGNANCY: Primary | ICD-10-CM

## 2024-07-24 DIAGNOSIS — F41.1 GENERALIZED ANXIETY DISORDER: ICD-10-CM

## 2024-07-24 DIAGNOSIS — O09.292 HISTORY OF PRE-ECLAMPSIA IN PRIOR PREGNANCY, CURRENTLY PREGNANT IN SECOND TRIMESTER: ICD-10-CM

## 2024-07-24 DIAGNOSIS — O09.90 SUPERVISION OF HIGH RISK PREGNANCY, ANTEPARTUM: ICD-10-CM

## 2024-07-24 DIAGNOSIS — Z98.891 HISTORY OF CESAREAN SECTION: ICD-10-CM

## 2024-07-24 DIAGNOSIS — O09.90 SUPERVISION OF HIGH RISK PREGNANCY, ANTEPARTUM: Primary | ICD-10-CM

## 2024-07-24 LAB — GLUCOSE 1H P 100 G GLC PO SERPL-MCNC: 105 MG/DL (ref 65–139)

## 2024-07-24 PROCEDURE — 76816 OB US FOLLOW-UP PER FETUS: CPT

## 2024-07-24 PROCEDURE — 82950 GLUCOSE TEST: CPT

## 2024-07-24 PROCEDURE — 76819 FETAL BIOPHYS PROFIL W/O NST: CPT

## 2024-07-24 PROCEDURE — 36415 COLL VENOUS BLD VENIPUNCTURE: CPT

## 2024-07-24 PROCEDURE — 82948 REAGENT STRIP/BLOOD GLUCOSE: CPT

## 2024-07-24 NOTE — PROGRESS NOTES
Chief Complaint   Patient presents with    Routine Prenatal Visit     Ob visit with glucola     Pregnancy Ultrasound     Pdc ultrasound        HPI: Nadya is a  currently at 32w2d who today reports the following:Headache - No ; Visual change - No ; Swelling in legs - No ; Nausea - No ; Constipation - No; Diarrhea - No ; Contractions - No ; Leaking fluid - No ; Vaginal bleeding -  No.      ROS: Pertinent items are noted in HPI.  Vitals: See prenatal flowsheet     EXAM:  Abdomen: See physician component of prenatal flowsheet   Urine glucose/protein: See physician component of prenatal flowsheet   Pelvic: See physician component of prenatal flowsheet     Prenatal Labs  Lab Results   Component Value Date    HGB 12.3 2024    RUBELLAABIGG 4.31 2024    HEPBSAG Non-Reactive 2024    ABORH O Positive 2022    ABO O 2024    RH Positive 2024    ABSCRN Negative 2024    [id number] Non-Reactive 2024    HEPCVIRUSABY Non-Reactive 2024    URINECX Final report 2020       MDM:  Impression:supervision of high risk pregnancy, Previous C/S with planned repeat C/S, and LGA on today's scan with GCT pending  Tests done today: GCT and U/S to complete the anatomic screening   Specific topics discussed at today's visit: Birth plan  Maternal monitoring of fetal movement   labor signs and symptoms  Symptoms of preeclampsia  No orders of the defined types were placed in this encounter.    Next visit:none

## 2024-07-24 NOTE — PROGRESS NOTES
Patient denies bleeding, leaking fluid or contractions  NIPT negative  Patients next follow up with Dr. Garcia is today

## 2024-08-07 ENCOUNTER — ROUTINE PRENATAL (OUTPATIENT)
Dept: OBSTETRICS AND GYNECOLOGY | Facility: CLINIC | Age: 34
End: 2024-08-07
Payer: COMMERCIAL

## 2024-08-07 VITALS — WEIGHT: 162 LBS | DIASTOLIC BLOOD PRESSURE: 66 MMHG | SYSTOLIC BLOOD PRESSURE: 120 MMHG | BODY MASS INDEX: 27.81 KG/M2

## 2024-08-07 DIAGNOSIS — O09.292 HISTORY OF PRE-ECLAMPSIA IN PRIOR PREGNANCY, CURRENTLY PREGNANT IN SECOND TRIMESTER: ICD-10-CM

## 2024-08-07 DIAGNOSIS — O09.90 SUPERVISION OF HIGH RISK PREGNANCY, ANTEPARTUM: ICD-10-CM

## 2024-08-07 DIAGNOSIS — Z3A.34 34 WEEKS GESTATION OF PREGNANCY: Primary | ICD-10-CM

## 2024-08-07 DIAGNOSIS — Z98.891 HISTORY OF CESAREAN SECTION: ICD-10-CM

## 2024-08-07 DIAGNOSIS — F41.1 GENERALIZED ANXIETY DISORDER: ICD-10-CM

## 2024-08-07 NOTE — PROGRESS NOTES
Chief Complaint   Patient presents with    Routine Prenatal Visit     Ob visit        HPI: Nadya is a  currently at 34w2d who today reports the following:Headache - No ; Visual change - No ; Swelling in legs - No ; Nausea - No ; Constipation - No; Diarrhea - No ; Contractions - No ; Leaking fluid - No ; Vaginal bleeding -  No.      ROS: Pertinent items are noted in HPI.  Vitals: See prenatal flowsheet     EXAM:  Abdomen: See physician component of prenatal flowsheet   Urine glucose/protein: See physician component of prenatal flowsheet   Pelvic: See physician component of prenatal flowsheet     Prenatal Labs  Lab Results   Component Value Date    HGB 12.3 2024    RUBELLAABIGG 4.31 2024    HEPBSAG Non-Reactive 2024    ABORH O Positive 2022    ABO O 2024    RH Positive 2024    ABSCRN Negative 2024    HJS0OGS9 Non-Reactive 2024    HEPCVIRUSABY Non-Reactive 2024    URINECX Final report 2020       MDM:  Impression:supervision of high risk pregnancy, Multiparous patient with medical complications, Previous C/S with planned repeat C/S, and 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    Next visit:GBS testing

## 2024-08-22 ENCOUNTER — ROUTINE PRENATAL (OUTPATIENT)
Dept: OBSTETRICS AND GYNECOLOGY | Facility: CLINIC | Age: 34
End: 2024-08-22
Payer: COMMERCIAL

## 2024-08-22 VITALS — BODY MASS INDEX: 28.67 KG/M2 | SYSTOLIC BLOOD PRESSURE: 110 MMHG | WEIGHT: 167 LBS | DIASTOLIC BLOOD PRESSURE: 60 MMHG

## 2024-08-22 DIAGNOSIS — O09.90 SUPERVISION OF HIGH RISK PREGNANCY, ANTEPARTUM: Primary | ICD-10-CM

## 2024-08-22 DIAGNOSIS — Z98.891 HISTORY OF CESAREAN SECTION: ICD-10-CM

## 2024-08-22 DIAGNOSIS — O09.292 HISTORY OF PRE-ECLAMPSIA IN PRIOR PREGNANCY, CURRENTLY PREGNANT IN SECOND TRIMESTER: ICD-10-CM

## 2024-08-22 DIAGNOSIS — Z3A.36 36 WEEKS GESTATION OF PREGNANCY: ICD-10-CM

## 2024-08-22 NOTE — PROGRESS NOTES
Chief Complaint   Patient presents with    Routine Prenatal Visit     Nst for decreased fetal movement       HPI: Nadya is a  currently at 36w3d who today reports the following:  Contractions - No; Leaking - No; Vaginal bleeding -  No; Swelling of extremities - No.  She has had some decreased fetal movement over all the last 4 days.  ROS:  GI: Nausea - No; Constipation - No; Diarrhea - No    Neuro: Headache - No; Visual change - No    Respiratory: Cough - No; SOB - No; fever - No     EXAM:  Vitals: See prenatal flowsheet   Abdomen: See prenatal flowsheet   Urine glucose/protein: See prenatal flowsheet   Pelvic: See prenatal flowsheet   MDM:   Impression: Supervision of high risk pregnancy  Previous C/S with planned repeat C/S   Tests done today: NST - reactive  GBS swab   Topics discussed: Prenatal labs reviewed  Continue with Rx prenatal vitamins and baby aspirin.  Labor signs and symptoms  Symptoms of preeclampsia   Tests scheduled today for her next visit:   none     Reason for test:  Decreased fetal movement  Date of Test: 2024  Time frame of test: 20 mins  NST Interpretation:  Baseline 135, moderate variability, + 15 x 15 accels, no decels noted. Minimal contractions noted. Overall reactive, Cat 1 tracing.

## 2024-08-29 ENCOUNTER — ROUTINE PRENATAL (OUTPATIENT)
Dept: OBSTETRICS AND GYNECOLOGY | Facility: CLINIC | Age: 34
End: 2024-08-29
Payer: COMMERCIAL

## 2024-08-29 VITALS — WEIGHT: 174 LBS | BODY MASS INDEX: 29.87 KG/M2 | SYSTOLIC BLOOD PRESSURE: 120 MMHG | DIASTOLIC BLOOD PRESSURE: 66 MMHG

## 2024-08-29 DIAGNOSIS — Z98.891 HISTORY OF CESAREAN SECTION: ICD-10-CM

## 2024-08-29 DIAGNOSIS — Z3A.37 37 WEEKS GESTATION OF PREGNANCY: Primary | ICD-10-CM

## 2024-08-29 DIAGNOSIS — O09.90 SUPERVISION OF HIGH RISK PREGNANCY, ANTEPARTUM: ICD-10-CM

## 2024-08-29 LAB
GLUCOSE UR STRIP-MCNC: NEGATIVE MG/DL
PROT UR STRIP-MCNC: NEGATIVE MG/DL

## 2024-08-29 NOTE — PROGRESS NOTES
Chief Complaint   Patient presents with    Routine Prenatal Visit     Ob visit        HPI: Nadya is a  currently at 37w3d who today reports the following:Headache - No ; Visual change - No ; Swelling in legs - No ; Nausea - No ; Constipation - No; Diarrhea - No ; Contractions - No ; Leaking fluid - No ; Vaginal bleeding -  No.      ROS: Pertinent items are noted in HPI.  Vitals: See prenatal flowsheet     EXAM:  Abdomen: See physician component of prenatal flowsheet   Urine glucose/protein: See physician component of prenatal flowsheet   Pelvic: See physician component of prenatal flowsheet     Prenatal Labs  Lab Results   Component Value Date    HGB 12.3 2024    RUBELLAABIGG 4.31 2024    HEPBSAG Non-Reactive 2024    ABORH O Positive 2022    ABO O 2024    RH Positive 2024    ABSCRN Negative 2024    [id number] Non-Reactive 2024    HEPCVIRUSABY Non-Reactive 2024    URINECX Final report 2020       MDM:  Impression:supervision of high risk pregnancy  Tests done today: none  Specific topics discussed at today's visit: Birth plan  Labor signs and symptoms  Maternal monitoring of fetal movement  Symptoms of preeclampsia   expectations  No orders of the defined types were placed in this encounter.    Next visit: CLAUDETTE

## 2024-09-06 ENCOUNTER — ROUTINE PRENATAL (OUTPATIENT)
Dept: OBSTETRICS AND GYNECOLOGY | Facility: CLINIC | Age: 34
End: 2024-09-06
Payer: COMMERCIAL

## 2024-09-06 ENCOUNTER — PRE-ADMISSION TESTING (OUTPATIENT)
Dept: PREADMISSION TESTING | Facility: HOSPITAL | Age: 34
End: 2024-09-06
Payer: COMMERCIAL

## 2024-09-06 VITALS — HEIGHT: 64 IN | WEIGHT: 167.44 LBS | BODY MASS INDEX: 28.59 KG/M2

## 2024-09-06 VITALS — WEIGHT: 168.4 LBS | SYSTOLIC BLOOD PRESSURE: 110 MMHG | BODY MASS INDEX: 28.91 KG/M2 | DIASTOLIC BLOOD PRESSURE: 80 MMHG

## 2024-09-06 DIAGNOSIS — Z3A.38 38 WEEKS GESTATION OF PREGNANCY: Primary | ICD-10-CM

## 2024-09-06 DIAGNOSIS — Z98.891 HISTORY OF CESAREAN SECTION: ICD-10-CM

## 2024-09-06 DIAGNOSIS — O09.90 SUPERVISION OF HIGH RISK PREGNANCY, ANTEPARTUM: ICD-10-CM

## 2024-09-06 DIAGNOSIS — O09.293 HISTORY OF PRE-ECLAMPSIA IN PRIOR PREGNANCY, CURRENTLY PREGNANT IN THIRD TRIMESTER: ICD-10-CM

## 2024-09-06 LAB
ABO GROUP BLD: NORMAL
BLD GP AB SCN SERPL QL: NEGATIVE
COLOR UR: ABNORMAL
DEPRECATED RDW RBC AUTO: 44.5 FL (ref 37–54)
ERYTHROCYTE [DISTWIDTH] IN BLOOD BY AUTOMATED COUNT: 13.4 % (ref 12.3–15.4)
GLUCOSE UR STRIP-MCNC: NEGATIVE MG/DL
HCT VFR BLD AUTO: 35.4 % (ref 34–46.6)
HGB BLD-MCNC: 11.8 G/DL (ref 12–15.9)
MCH RBC QN AUTO: 30.2 PG (ref 26.6–33)
MCHC RBC AUTO-ENTMCNC: 33.3 G/DL (ref 31.5–35.7)
MCV RBC AUTO: 90.5 FL (ref 79–97)
PLATELET # BLD AUTO: 248 10*3/MM3 (ref 140–450)
PMV BLD AUTO: 10.2 FL (ref 6–12)
PROT UR STRIP-MCNC: ABNORMAL MG/DL
RBC # BLD AUTO: 3.91 10*6/MM3 (ref 3.77–5.28)
RH BLD: POSITIVE
T&S EXPIRATION DATE: NORMAL
TREPONEMA PALLIDUM IGG+IGM AB [PRESENCE] IN SERUM OR PLASMA BY IMMUNOASSAY: NORMAL
WBC NRBC COR # BLD AUTO: 12.08 10*3/MM3 (ref 3.4–10.8)

## 2024-09-06 PROCEDURE — 86850 RBC ANTIBODY SCREEN: CPT | Performed by: OBSTETRICS & GYNECOLOGY

## 2024-09-06 PROCEDURE — 86900 BLOOD TYPING SEROLOGIC ABO: CPT | Performed by: OBSTETRICS & GYNECOLOGY

## 2024-09-06 PROCEDURE — 86901 BLOOD TYPING SEROLOGIC RH(D): CPT | Performed by: OBSTETRICS & GYNECOLOGY

## 2024-09-06 PROCEDURE — 85027 COMPLETE CBC AUTOMATED: CPT | Performed by: OBSTETRICS & GYNECOLOGY

## 2024-09-06 PROCEDURE — 86780 TREPONEMA PALLIDUM: CPT | Performed by: OBSTETRICS & GYNECOLOGY

## 2024-09-06 NOTE — DISCHARGE INSTRUCTIONS
What to know before your arrive:    -Do not eat, drink or chew gum beginning 8 hours before your scheduled arrival time to the hospital.  Except please drink 20 ounces of Gatorade and complete two hours before your given arrival time to the hospital.  If you drink too close to surgery time, your sugery may  be delayed or cancelled.  Please complete as instructed.     -Do not shave any part of your body including abdomen or pelvic are for two days before your procedure.  -If you are taking a scheduled medication (insulin, blood pressure medicine,antibiotics) please consult with your physician whether to take on the day of surgery.  -Remove all jewelry including rings, wedding bands, and piercing before coming to the hospital.  -Leave important valuables at home.  -Do not wear dark fingernail polish.  -Please take two Tylenol 500 mg tablets the evening before surgery.  -Bring the following with you to the hospital:    -Picture ID and insurance, Medicare or Medicaid cards    -Co-pay/deductible required by insurance (Cash, Check, Credit Card)    -Copy of living will or power  document (if applicable)    -CPAP mask and tubing, not machine (if applicable)    -Skin prep instructions sheet    What to know the day of procedure:    -Park in the [facility], take elevator for first floor, exit to the right and  proceed through the doors to outside, follow the covered sidewalk to the entrance of the [facility], [facility] and [facility], enter the [facility] [place] to your right BEFORE entering the 17[address] lobby.  Take the elevators to the 3rd floor ([address] [facility] [place]).  -Leave unnecessary items in your vehicle, including your suitcase.  Your support  person or a family member can get it for you after your procedure.   -Check in at the reception desk in the lobby of the 3rd floor ([address] [place]).   -One person may accompany you to the pre-op/recovery area.  Please have  other family members wait in the  waiting room.   -An anesthesiologist will meet with your prior to your procedure.   -After anesthesia has been initiated, one person may accompany you in the  operating room.   -No video cameras are permitted in the operating room; only still cameras,  Please.      What to expect while you are in recovery:     -One person may stay with you while you are in recovery.   -If the baby is stable, he/she may visit to initiate breastfeeding & [facility].      CHLORHEXIDINE GLUCONATE WIPES AND INSTRUCTIONS GIVEN TO PATIENT

## 2024-09-06 NOTE — PAT
An arrival time for procedure was not provided during PAT visit. If patient had any questions or concerns about their arrival time, they were instructed to contact their surgeon/physician.  Additionally, if the patient referred to an arrival time that was acquired from their my chart account, patient was encouraged to verify that time with their surgeon/physician. Arrival times are NOT provided in Pre Admission Testing Department.    Patient viewed general PAT education video as instructed in their preoperative information received from their surgeon.  Patient stated the general PAT education video was viewed in its entirety and survey completed.  Copies of PAT general education handouts (Incentive Spirometry, Meds to Beds Program, Patient Belongings, Pre-op skin preparation instructions, Blood Glucose testing, Visitor policy, Surgery FAQ, Code H) distributed to patient if not printed. Education related to the PAT pass and skin preparation for surgery (if applicable) completed in PAT as a reinforcement to PAT education video. Patient instructed to return PAT pass provided today as well as completed skin preparation sheet (if applicable) on the day of procedure.     Additionally if patient had not viewed video yet but intended to view it at home or in our waiting area, then referred them to the handout with QR code/link provided during PAT visit.  Instructed patient to complete survey after viewing the video in its entirety.  Encouraged patient/family to read PAT general education handouts thoroughly and notify PAT staff with any questions or concerns. Patient verbalized understanding of all information and priority content.    Patient denies any current skin issues.     Patient to apply Chlorhexadine wipes  to surgical area (as instructed) the night before procedure and the AM of procedure. Wipes provided.    Instructed patient to take two Tylenol extra strength (total of 1000 mg) the night before  surgery.    Patient instructed to drink 20 ounces of Gatorade or Gatorlyte (if diabetic) and it needs to be completed 1 hour (for Main OR patients) or 2 hours (scheduled  section & [facility] patients) before given arrival time for procedure (NO RED Gatorade and NO Gatorade Zero).    Patient verbalized understanding.    Blood bank bracelet applied to patient during Pre Admission Testing visit.  Patient instructed not to remove from arm until after procedure and they are discharged from the hospital.  Explained to patient that they may shower and get the bracelet wet, but not to immerse under water for longer periods (bathing, swimming, hand dishwashing, etc).  Patient verbalized understanding.    Incorrect order or no order for procedure consent.  Please obtain procedure consent on the day of procedure.

## 2024-09-06 NOTE — PROGRESS NOTES
Chief Complaint   Patient presents with    Routine Prenatal Visit       HPI: Nadya is a  currently at 38w4d who today reports the following: She reports good fetal movement.  Contractions - No; Leaking - No; Vaginal bleeding -  No; Swelling of extremities - No.    ROS:  GI: Nausea - No; Constipation - No; Diarrhea - No    Neuro: Headache - No; Visual change - No      EXAM:  Vitals: See prenatal flowsheet   Abdomen: See prenatal flowsheet   Urine glucose/protein: See prenatal flowsheet   Pelvic: See prenatal flowsheet   MDM:   Impression: Supervision of high risk pregnancy  Pre-eclampsia  Previous C/S with planned repeat C/S  Anxiety currently well-controlled   Tests done today: Urine dip for protein and glucose  pat   Topics discussed: Continue with PNV's  Prenatal labs reviewed  Labor signs and symptoms  Symptoms of preeclampsia  Gail mendez reviewed   Tests scheduled today for her next visit:   none  Has repeat  scheduled for 9 AM on 2024

## 2024-09-09 ENCOUNTER — ANESTHESIA (OUTPATIENT)
Dept: LABOR AND DELIVERY | Facility: HOSPITAL | Age: 34
End: 2024-09-09
Payer: COMMERCIAL

## 2024-09-09 ENCOUNTER — HOSPITAL ENCOUNTER (INPATIENT)
Facility: HOSPITAL | Age: 34
LOS: 3 days | Discharge: HOME | End: 2024-09-12
Attending: OBSTETRICS & GYNECOLOGY | Admitting: OBSTETRICS & GYNECOLOGY
Payer: COMMERCIAL

## 2024-09-09 ENCOUNTER — ANESTHESIA EVENT (OUTPATIENT)
Dept: LABOR AND DELIVERY | Facility: HOSPITAL | Age: 34
End: 2024-09-09
Payer: COMMERCIAL

## 2024-09-09 DIAGNOSIS — Z98.891 HISTORY OF CESAREAN SECTION: ICD-10-CM

## 2024-09-09 LAB
ALP SERPL-CCNC: 153 U/L (ref 39–117)
ALT SERPL W P-5'-P-CCNC: 6 U/L (ref 1–33)
AST SERPL-CCNC: 21 U/L (ref 1–32)
BILIRUB SERPL-MCNC: 0.2 MG/DL (ref 0–1.2)
CREAT SERPL-MCNC: 0.62 MG/DL (ref 0.57–1)
DEPRECATED RDW RBC AUTO: 43.5 FL (ref 37–54)
DEPRECATED RDW RBC AUTO: 43.5 FL (ref 37–54)
ERYTHROCYTE [DISTWIDTH] IN BLOOD BY AUTOMATED COUNT: 13.2 % (ref 12.3–15.4)
ERYTHROCYTE [DISTWIDTH] IN BLOOD BY AUTOMATED COUNT: 13.2 % (ref 12.3–15.4)
FIBRINOGEN PPP-MCNC: 282 MG/DL (ref 203–470)
HCT VFR BLD AUTO: 25.5 % (ref 34–46.6)
HCT VFR BLD AUTO: 30.7 % (ref 34–46.6)
HGB BLD-MCNC: 10.2 G/DL (ref 12–15.9)
HGB BLD-MCNC: 8.6 G/DL (ref 12–15.9)
LDH SERPL-CCNC: 275 U/L (ref 135–214)
MCH RBC QN AUTO: 30 PG (ref 26.6–33)
MCH RBC QN AUTO: 30.1 PG (ref 26.6–33)
MCHC RBC AUTO-ENTMCNC: 33.2 G/DL (ref 31.5–35.7)
MCHC RBC AUTO-ENTMCNC: 33.7 G/DL (ref 31.5–35.7)
MCV RBC AUTO: 89.2 FL (ref 79–97)
MCV RBC AUTO: 90.3 FL (ref 79–97)
PLATELET # BLD AUTO: 206 10*3/MM3 (ref 140–450)
PLATELET # BLD AUTO: 212 10*3/MM3 (ref 140–450)
PMV BLD AUTO: 10.5 FL (ref 6–12)
PMV BLD AUTO: 10.9 FL (ref 6–12)
RBC # BLD AUTO: 2.86 10*6/MM3 (ref 3.77–5.28)
RBC # BLD AUTO: 3.4 10*6/MM3 (ref 3.77–5.28)
URATE SERPL-MCNC: 4 MG/DL (ref 2.4–5.7)
WBC NRBC COR # BLD AUTO: 16.4 10*3/MM3 (ref 3.4–10.8)
WBC NRBC COR # BLD AUTO: 19.63 10*3/MM3 (ref 3.4–10.8)

## 2024-09-09 PROCEDURE — S0260 H&P FOR SURGERY: HCPCS | Performed by: OBSTETRICS & GYNECOLOGY

## 2024-09-09 PROCEDURE — 25010000002 BUPIVACAINE IN DEXTROSE 0.75-8.25 % SOLUTION: Performed by: NURSE ANESTHETIST, CERTIFIED REGISTERED

## 2024-09-09 PROCEDURE — 25010000002 KETOROLAC TROMETHAMINE PER 15 MG: Performed by: OBSTETRICS & GYNECOLOGY

## 2024-09-09 PROCEDURE — 25010000002 GLYCOPYRROLATE 1 MG/5ML SOLUTION: Performed by: NURSE ANESTHETIST, CERTIFIED REGISTERED

## 2024-09-09 PROCEDURE — 25810000003 LACTATED RINGERS PER 1000 ML: Performed by: OBSTETRICS & GYNECOLOGY

## 2024-09-09 PROCEDURE — 84075 ASSAY ALKALINE PHOSPHATASE: CPT | Performed by: OBSTETRICS & GYNECOLOGY

## 2024-09-09 PROCEDURE — 25010000002 METHYLERGONOVINE MALEATE PER 0.2 MG: Performed by: OBSTETRICS & GYNECOLOGY

## 2024-09-09 PROCEDURE — 85027 COMPLETE CBC AUTOMATED: CPT | Performed by: OBSTETRICS & GYNECOLOGY

## 2024-09-09 PROCEDURE — 25810000003 LACTATED RINGERS SOLUTION: Performed by: OBSTETRICS & GYNECOLOGY

## 2024-09-09 PROCEDURE — 63710000001 PROMETHAZINE PER 25 MG: Performed by: OBSTETRICS & GYNECOLOGY

## 2024-09-09 PROCEDURE — 59510 CESAREAN DELIVERY: CPT | Performed by: OBSTETRICS & GYNECOLOGY

## 2024-09-09 PROCEDURE — 84450 TRANSFERASE (AST) (SGOT): CPT | Performed by: OBSTETRICS & GYNECOLOGY

## 2024-09-09 PROCEDURE — C1765 ADHESION BARRIER: HCPCS | Performed by: OBSTETRICS & GYNECOLOGY

## 2024-09-09 PROCEDURE — 59025 FETAL NON-STRESS TEST: CPT

## 2024-09-09 PROCEDURE — 85384 FIBRINOGEN ACTIVITY: CPT | Performed by: OBSTETRICS & GYNECOLOGY

## 2024-09-09 PROCEDURE — 82247 BILIRUBIN TOTAL: CPT | Performed by: OBSTETRICS & GYNECOLOGY

## 2024-09-09 PROCEDURE — 25010000002 MIDAZOLAM PER 1 MG: Performed by: NURSE ANESTHETIST, CERTIFIED REGISTERED

## 2024-09-09 PROCEDURE — 25010000002 MORPHINE PER 10 MG: Performed by: NURSE ANESTHETIST, CERTIFIED REGISTERED

## 2024-09-09 PROCEDURE — 83615 LACTATE (LD) (LDH) ENZYME: CPT | Performed by: OBSTETRICS & GYNECOLOGY

## 2024-09-09 PROCEDURE — 25010000002 FENTANYL CITRATE (PF) 50 MCG/ML SOLUTION: Performed by: NURSE ANESTHETIST, CERTIFIED REGISTERED

## 2024-09-09 PROCEDURE — 25010000002 CEFAZOLIN PER 500 MG: Performed by: OBSTETRICS & GYNECOLOGY

## 2024-09-09 PROCEDURE — 84460 ALANINE AMINO (ALT) (SGPT): CPT | Performed by: OBSTETRICS & GYNECOLOGY

## 2024-09-09 PROCEDURE — 25010000002 ONDANSETRON PER 1 MG: Performed by: NURSE ANESTHETIST, CERTIFIED REGISTERED

## 2024-09-09 PROCEDURE — 82565 ASSAY OF CREATININE: CPT | Performed by: OBSTETRICS & GYNECOLOGY

## 2024-09-09 PROCEDURE — 84550 ASSAY OF BLOOD/URIC ACID: CPT | Performed by: OBSTETRICS & GYNECOLOGY

## 2024-09-09 DEVICE — ABSORBABLE ADHESION BARRIER
Type: IMPLANTABLE DEVICE | Site: ABDOMEN | Status: FUNCTIONAL
Brand: GYNECARE INTERCEED

## 2024-09-09 RX ORDER — METHYLERGONOVINE MALEATE 0.2 MG/ML
200 INJECTION INTRAVENOUS ONCE AS NEEDED
Status: DISCONTINUED | OUTPATIENT
Start: 2024-09-09 | End: 2024-09-09 | Stop reason: HOSPADM

## 2024-09-09 RX ORDER — SODIUM CHLORIDE 0.9 % (FLUSH) 0.9 %
3-10 SYRINGE (ML) INJECTION AS NEEDED
Status: DISCONTINUED | OUTPATIENT
Start: 2024-09-09 | End: 2024-09-12 | Stop reason: HOSPADM

## 2024-09-09 RX ORDER — ACETAMINOPHEN 500 MG
1000 TABLET ORAL ONCE
Status: COMPLETED | OUTPATIENT
Start: 2024-09-09 | End: 2024-09-09

## 2024-09-09 RX ORDER — OXYTOCIN/0.9 % SODIUM CHLORIDE 30/500 ML
125 PLASTIC BAG, INJECTION (ML) INTRAVENOUS ONCE AS NEEDED
Status: DISCONTINUED | OUTPATIENT
Start: 2024-09-09 | End: 2024-09-12 | Stop reason: HOSPADM

## 2024-09-09 RX ORDER — DOCUSATE SODIUM 100 MG/1
100 CAPSULE, LIQUID FILLED ORAL 2 TIMES DAILY PRN
Status: DISCONTINUED | OUTPATIENT
Start: 2024-09-09 | End: 2024-09-12 | Stop reason: HOSPADM

## 2024-09-09 RX ORDER — OXYCODONE HYDROCHLORIDE 10 MG/1
10 TABLET ORAL EVERY 4 HOURS PRN
Status: DISCONTINUED | OUTPATIENT
Start: 2024-09-09 | End: 2024-09-09 | Stop reason: SDUPTHER

## 2024-09-09 RX ORDER — ALUMINA, MAGNESIA, AND SIMETHICONE 2400; 2400; 240 MG/30ML; MG/30ML; MG/30ML
15 SUSPENSION ORAL EVERY 4 HOURS PRN
Status: DISCONTINUED | OUTPATIENT
Start: 2024-09-09 | End: 2024-09-12 | Stop reason: HOSPADM

## 2024-09-09 RX ORDER — ONDANSETRON 2 MG/ML
INJECTION INTRAMUSCULAR; INTRAVENOUS AS NEEDED
Status: DISCONTINUED | OUTPATIENT
Start: 2024-09-09 | End: 2024-09-09 | Stop reason: SURG

## 2024-09-09 RX ORDER — CARBOPROST TROMETHAMINE 250 UG/ML
250 INJECTION, SOLUTION INTRAMUSCULAR
Status: DISCONTINUED | OUTPATIENT
Start: 2024-09-09 | End: 2024-09-09 | Stop reason: HOSPADM

## 2024-09-09 RX ORDER — KETOROLAC TROMETHAMINE 30 MG/ML
30 INJECTION, SOLUTION INTRAMUSCULAR; INTRAVENOUS ONCE
Status: COMPLETED | OUTPATIENT
Start: 2024-09-09 | End: 2024-09-09

## 2024-09-09 RX ORDER — SIMETHICONE 80 MG
80 TABLET,CHEWABLE ORAL 4 TIMES DAILY PRN
Status: DISCONTINUED | OUTPATIENT
Start: 2024-09-09 | End: 2024-09-12 | Stop reason: HOSPADM

## 2024-09-09 RX ORDER — HYDROCORTISONE 25 MG/G
1 CREAM TOPICAL AS NEEDED
Status: DISCONTINUED | OUTPATIENT
Start: 2024-09-09 | End: 2024-09-12 | Stop reason: HOSPADM

## 2024-09-09 RX ORDER — BUPIVACAINE HCL/0.9 % NACL/PF 0.125 %
PLASTIC BAG, INJECTION (ML) EPIDURAL AS NEEDED
Status: DISCONTINUED | OUTPATIENT
Start: 2024-09-09 | End: 2024-09-09 | Stop reason: SURG

## 2024-09-09 RX ORDER — MISOPROSTOL 200 UG/1
800 TABLET ORAL ONCE
Status: COMPLETED | OUTPATIENT
Start: 2024-09-09 | End: 2024-09-09

## 2024-09-09 RX ORDER — CALCIUM CARBONATE 500 MG/1
1 TABLET, CHEWABLE ORAL EVERY 4 HOURS PRN
Status: DISCONTINUED | OUTPATIENT
Start: 2024-09-09 | End: 2024-09-12 | Stop reason: HOSPADM

## 2024-09-09 RX ORDER — NICOTINE 21 MG/24HR
1 PATCH, TRANSDERMAL 24 HOURS TRANSDERMAL EVERY 24 HOURS
Status: DISCONTINUED | OUTPATIENT
Start: 2024-09-09 | End: 2024-09-10

## 2024-09-09 RX ORDER — MISOPROSTOL 200 UG/1
600 TABLET ORAL AS NEEDED
Status: DISCONTINUED | OUTPATIENT
Start: 2024-09-09 | End: 2024-09-12 | Stop reason: HOSPADM

## 2024-09-09 RX ORDER — HYDROCODONE BITARTRATE AND ACETAMINOPHEN 10; 325 MG/1; MG/1
1 TABLET ORAL EVERY 4 HOURS PRN
Status: DISCONTINUED | OUTPATIENT
Start: 2024-09-09 | End: 2024-09-12 | Stop reason: HOSPADM

## 2024-09-09 RX ORDER — METHYLERGONOVINE MALEATE 0.2 MG/ML
200 INJECTION INTRAVENOUS AS NEEDED
Status: DISCONTINUED | OUTPATIENT
Start: 2024-09-09 | End: 2024-09-12 | Stop reason: HOSPADM

## 2024-09-09 RX ORDER — ACETAMINOPHEN 500 MG
1000 TABLET ORAL EVERY 6 HOURS
Status: DISCONTINUED | OUTPATIENT
Start: 2024-09-09 | End: 2024-09-09

## 2024-09-09 RX ORDER — OXYTOCIN/0.9 % SODIUM CHLORIDE 30/500 ML
650 PLASTIC BAG, INJECTION (ML) INTRAVENOUS ONCE
Status: DISCONTINUED | OUTPATIENT
Start: 2024-09-09 | End: 2024-09-09 | Stop reason: HOSPADM

## 2024-09-09 RX ORDER — SODIUM CHLORIDE, SODIUM LACTATE, POTASSIUM CHLORIDE, CALCIUM CHLORIDE 600; 310; 30; 20 MG/100ML; MG/100ML; MG/100ML; MG/100ML
125 INJECTION, SOLUTION INTRAVENOUS CONTINUOUS
Status: DISCONTINUED | OUTPATIENT
Start: 2024-09-09 | End: 2024-09-09

## 2024-09-09 RX ORDER — MORPHINE SULFATE 0.5 MG/ML
INJECTION, SOLUTION EPIDURAL; INTRATHECAL; INTRAVENOUS AS NEEDED
Status: DISCONTINUED | OUTPATIENT
Start: 2024-09-09 | End: 2024-09-09 | Stop reason: SURG

## 2024-09-09 RX ORDER — SODIUM CHLORIDE 0.9 % (FLUSH) 0.9 %
10 SYRINGE (ML) INJECTION EVERY 12 HOURS SCHEDULED
Status: DISCONTINUED | OUTPATIENT
Start: 2024-09-09 | End: 2024-09-09 | Stop reason: HOSPADM

## 2024-09-09 RX ORDER — CITRIC ACID/SODIUM CITRATE 334-500MG
30 SOLUTION, ORAL ORAL ONCE
Status: COMPLETED | OUTPATIENT
Start: 2024-09-09 | End: 2024-09-09

## 2024-09-09 RX ORDER — SODIUM CHLORIDE 0.9 % (FLUSH) 0.9 %
10 SYRINGE (ML) INJECTION AS NEEDED
Status: DISCONTINUED | OUTPATIENT
Start: 2024-09-09 | End: 2024-09-09 | Stop reason: HOSPADM

## 2024-09-09 RX ORDER — FENTANYL CITRATE 50 UG/ML
50 INJECTION, SOLUTION INTRAMUSCULAR; INTRAVENOUS
Status: DISCONTINUED | OUTPATIENT
Start: 2024-09-09 | End: 2024-09-09 | Stop reason: HOSPADM

## 2024-09-09 RX ORDER — BUPIVACAINE HYDROCHLORIDE 7.5 MG/ML
INJECTION, SOLUTION INTRASPINAL AS NEEDED
Status: DISCONTINUED | OUTPATIENT
Start: 2024-09-09 | End: 2024-09-09 | Stop reason: SURG

## 2024-09-09 RX ORDER — FAMOTIDINE 10 MG/ML
INJECTION, SOLUTION INTRAVENOUS AS NEEDED
Status: DISCONTINUED | OUTPATIENT
Start: 2024-09-09 | End: 2024-09-09 | Stop reason: SURG

## 2024-09-09 RX ORDER — ONDANSETRON 2 MG/ML
4 INJECTION INTRAMUSCULAR; INTRAVENOUS ONCE AS NEEDED
Status: DISCONTINUED | OUTPATIENT
Start: 2024-09-09 | End: 2024-09-09 | Stop reason: HOSPADM

## 2024-09-09 RX ORDER — OXYCODONE HYDROCHLORIDE 5 MG/1
5 TABLET ORAL EVERY 4 HOURS PRN
Status: DISCONTINUED | OUTPATIENT
Start: 2024-09-09 | End: 2024-09-09 | Stop reason: SDUPTHER

## 2024-09-09 RX ORDER — PROMETHAZINE HYDROCHLORIDE 25 MG/1
25 TABLET ORAL EVERY 6 HOURS PRN
Status: DISCONTINUED | OUTPATIENT
Start: 2024-09-09 | End: 2024-09-12 | Stop reason: HOSPADM

## 2024-09-09 RX ORDER — ACETAMINOPHEN 325 MG/1
650 TABLET ORAL EVERY 6 HOURS
Status: DISCONTINUED | OUTPATIENT
Start: 2024-09-10 | End: 2024-09-09

## 2024-09-09 RX ORDER — OXYTOCIN/0.9 % SODIUM CHLORIDE 30/500 ML
PLASTIC BAG, INJECTION (ML) INTRAVENOUS CONTINUOUS PRN
Status: DISCONTINUED | OUTPATIENT
Start: 2024-09-09 | End: 2024-09-09 | Stop reason: SURG

## 2024-09-09 RX ORDER — CARBOPROST TROMETHAMINE 250 UG/ML
250 INJECTION, SOLUTION INTRAMUSCULAR AS NEEDED
Status: DISCONTINUED | OUTPATIENT
Start: 2024-09-09 | End: 2024-09-12 | Stop reason: HOSPADM

## 2024-09-09 RX ORDER — GLYCOPYRROLATE 0.2 MG/ML
INJECTION INTRAMUSCULAR; INTRAVENOUS AS NEEDED
Status: DISCONTINUED | OUTPATIENT
Start: 2024-09-09 | End: 2024-09-09 | Stop reason: SURG

## 2024-09-09 RX ORDER — PROMETHAZINE HYDROCHLORIDE 12.5 MG/1
12.5 SUPPOSITORY RECTAL EVERY 6 HOURS PRN
Status: DISCONTINUED | OUTPATIENT
Start: 2024-09-09 | End: 2024-09-12 | Stop reason: HOSPADM

## 2024-09-09 RX ORDER — KETOROLAC TROMETHAMINE 15 MG/ML
15 INJECTION, SOLUTION INTRAMUSCULAR; INTRAVENOUS EVERY 6 HOURS
Status: COMPLETED | OUTPATIENT
Start: 2024-09-09 | End: 2024-09-10

## 2024-09-09 RX ORDER — MISOPROSTOL 200 UG/1
800 TABLET ORAL ONCE AS NEEDED
Status: DISCONTINUED | OUTPATIENT
Start: 2024-09-09 | End: 2024-09-09 | Stop reason: HOSPADM

## 2024-09-09 RX ORDER — SODIUM CHLORIDE 0.9 % (FLUSH) 0.9 %
3 SYRINGE (ML) INJECTION EVERY 12 HOURS SCHEDULED
Status: DISCONTINUED | OUTPATIENT
Start: 2024-09-09 | End: 2024-09-12

## 2024-09-09 RX ORDER — LIDOCAINE HYDROCHLORIDE 10 MG/ML
0.5 INJECTION, SOLUTION EPIDURAL; INFILTRATION; INTRACAUDAL; PERINEURAL ONCE AS NEEDED
Status: DISCONTINUED | OUTPATIENT
Start: 2024-09-09 | End: 2024-09-09 | Stop reason: HOSPADM

## 2024-09-09 RX ORDER — ACETAMINOPHEN 500 MG
500 TABLET ORAL EVERY 4 HOURS PRN
Status: DISCONTINUED | OUTPATIENT
Start: 2024-09-09 | End: 2024-09-10

## 2024-09-09 RX ORDER — MIDAZOLAM HYDROCHLORIDE 1 MG/ML
INJECTION INTRAMUSCULAR; INTRAVENOUS AS NEEDED
Status: DISCONTINUED | OUTPATIENT
Start: 2024-09-09 | End: 2024-09-09 | Stop reason: SURG

## 2024-09-09 RX ORDER — FENTANYL CITRATE 50 UG/ML
INJECTION, SOLUTION INTRAMUSCULAR; INTRAVENOUS AS NEEDED
Status: DISCONTINUED | OUTPATIENT
Start: 2024-09-09 | End: 2024-09-09 | Stop reason: SURG

## 2024-09-09 RX ORDER — SODIUM CHLORIDE 9 MG/ML
40 INJECTION, SOLUTION INTRAVENOUS AS NEEDED
Status: DISCONTINUED | OUTPATIENT
Start: 2024-09-09 | End: 2024-09-12 | Stop reason: HOSPADM

## 2024-09-09 RX ORDER — OXYTOCIN/0.9 % SODIUM CHLORIDE 30/500 ML
85 PLASTIC BAG, INJECTION (ML) INTRAVENOUS ONCE
Status: DISCONTINUED | OUTPATIENT
Start: 2024-09-09 | End: 2024-09-09 | Stop reason: HOSPADM

## 2024-09-09 RX ORDER — IBUPROFEN 600 MG/1
600 TABLET, FILM COATED ORAL EVERY 6 HOURS
Status: DISCONTINUED | OUTPATIENT
Start: 2024-09-10 | End: 2024-09-12 | Stop reason: HOSPADM

## 2024-09-09 RX ORDER — NIFEDIPINE 10 MG/1
20 CAPSULE ORAL ONCE
Status: COMPLETED | OUTPATIENT
Start: 2024-09-09 | End: 2024-09-09

## 2024-09-09 RX ORDER — ACETAMINOPHEN 325 MG/1
650 TABLET ORAL EVERY 6 HOURS
Status: DISCONTINUED | OUTPATIENT
Start: 2024-09-11 | End: 2024-09-10

## 2024-09-09 RX ORDER — BUSPIRONE HYDROCHLORIDE 5 MG/1
5 TABLET ORAL EVERY 12 HOURS SCHEDULED
Status: DISCONTINUED | OUTPATIENT
Start: 2024-09-09 | End: 2024-09-12 | Stop reason: HOSPADM

## 2024-09-09 RX ORDER — HYDROCODONE BITARTRATE AND ACETAMINOPHEN 5; 325 MG/1; MG/1
1 TABLET ORAL EVERY 4 HOURS PRN
Status: DISCONTINUED | OUTPATIENT
Start: 2024-09-09 | End: 2024-09-12 | Stop reason: HOSPADM

## 2024-09-09 RX ORDER — SODIUM CHLORIDE 9 MG/ML
40 INJECTION, SOLUTION INTRAVENOUS AS NEEDED
Status: DISCONTINUED | OUTPATIENT
Start: 2024-09-09 | End: 2024-09-09 | Stop reason: HOSPADM

## 2024-09-09 RX ADMIN — BUPIVACAINE HYDROCHLORIDE IN DEXTROSE 1.4 ML: 7.5 INJECTION, SOLUTION SUBARACHNOID at 09:22

## 2024-09-09 RX ADMIN — PROMETHAZINE HYDROCHLORIDE 25 MG: 25 TABLET ORAL at 17:34

## 2024-09-09 RX ADMIN — Medication 100 MCG: at 09:30

## 2024-09-09 RX ADMIN — SODIUM CHLORIDE, POTASSIUM CHLORIDE, SODIUM LACTATE AND CALCIUM CHLORIDE 125 ML/HR: 600; 310; 30; 20 INJECTION, SOLUTION INTRAVENOUS at 09:06

## 2024-09-09 RX ADMIN — MIDAZOLAM 1 MG: 1 INJECTION INTRAMUSCULAR; INTRAVENOUS at 09:55

## 2024-09-09 RX ADMIN — Medication 100 MCG: at 09:56

## 2024-09-09 RX ADMIN — GLYCOPYRROLATE 0.2 MG: 0.2 INJECTION INTRAMUSCULAR; INTRAVENOUS at 09:16

## 2024-09-09 RX ADMIN — ACETAMINOPHEN 1000 MG: 500 TABLET ORAL at 13:50

## 2024-09-09 RX ADMIN — SODIUM CHLORIDE, POTASSIUM CHLORIDE, SODIUM LACTATE AND CALCIUM CHLORIDE: 600; 310; 30; 20 INJECTION, SOLUTION INTRAVENOUS at 09:35

## 2024-09-09 RX ADMIN — Medication 1000 ML/HR: at 09:47

## 2024-09-09 RX ADMIN — MIDAZOLAM 1 MG: 1 INJECTION INTRAMUSCULAR; INTRAVENOUS at 09:16

## 2024-09-09 RX ADMIN — KETOROLAC TROMETHAMINE 15 MG: 15 INJECTION, SOLUTION INTRAMUSCULAR; INTRAVENOUS at 17:34

## 2024-09-09 RX ADMIN — SODIUM CHLORIDE 2000 MG: 900 INJECTION INTRAVENOUS at 09:05

## 2024-09-09 RX ADMIN — KETOROLAC TROMETHAMINE 30 MG: 30 INJECTION, SOLUTION INTRAMUSCULAR; INTRAVENOUS at 11:13

## 2024-09-09 RX ADMIN — ONDANSETRON 4 MG: 2 INJECTION INTRAMUSCULAR; INTRAVENOUS at 09:16

## 2024-09-09 RX ADMIN — NIFEDIPINE 20 MG: 10 CAPSULE ORAL at 13:51

## 2024-09-09 RX ADMIN — SODIUM CITRATE AND CITRIC ACID MONOHYDRATE 30 ML: 500; 334 SOLUTION ORAL at 09:06

## 2024-09-09 RX ADMIN — SODIUM CHLORIDE, POTASSIUM CHLORIDE, SODIUM LACTATE AND CALCIUM CHLORIDE 1000 ML: 600; 310; 30; 20 INJECTION, SOLUTION INTRAVENOUS at 08:21

## 2024-09-09 RX ADMIN — MORPHINE SULFATE 0.1 MG: 0.5 INJECTION, SOLUTION EPIDURAL; INTRATHECAL; INTRAVENOUS at 09:22

## 2024-09-09 RX ADMIN — KETOROLAC TROMETHAMINE 15 MG: 15 INJECTION, SOLUTION INTRAMUSCULAR; INTRAVENOUS at 23:59

## 2024-09-09 RX ADMIN — ACETAMINOPHEN 1000 MG: 500 TABLET ORAL at 08:38

## 2024-09-09 RX ADMIN — FAMOTIDINE 20 MG: 10 INJECTION, SOLUTION INTRAVENOUS at 09:16

## 2024-09-09 RX ADMIN — HYDROCODONE BITARTRATE AND ACETAMINOPHEN 1 TABLET: 10; 325 TABLET ORAL at 21:54

## 2024-09-09 RX ADMIN — METHYLERGONOVINE MALEATE 200 MCG: 0.2 INJECTION, SOLUTION INTRAMUSCULAR; INTRAVENOUS at 19:32

## 2024-09-09 RX ADMIN — BUSPIRONE HYDROCHLORIDE 5 MG: 5 TABLET ORAL at 21:54

## 2024-09-09 RX ADMIN — MISOPROSTOL 800 MCG: 200 TABLET ORAL at 21:54

## 2024-09-09 RX ADMIN — FENTANYL CITRATE 20 MCG: 50 INJECTION, SOLUTION INTRAMUSCULAR; INTRAVENOUS at 09:22

## 2024-09-09 NOTE — ANESTHESIA PROCEDURE NOTES
Spinal Block      Patient reassessed immediately prior to procedure    Indication:procedure for pain  Performed By  CRNA/CAA: Pat Ayon CRNA  Preanesthetic Checklist  Completed: patient identified, IV checked, risks and benefits discussed, surgical consent, monitors and equipment checked, pre-op evaluation and timeout performed  Spinal Block Prep:  Patient Position:sitting  Sterile Tech:cap, gloves, mask and sterile barriers  Prep:Betadine  Patient Monitoring:blood pressure monitoring, continuous pulse oximetry and EKG    Spinal Block Procedure  Approach:midline  Guidance:palpation technique  Location:L3-L4  Needle Type:Denise  Needle Gauge:25 G  Placement of Spinal needle event:cerebrospinal fluid aspirated  Paresthesia: no  Fluid Appearance:clear     Post Assessment  Patient Tolerance:patient tolerated the procedure well with no apparent complications  Complications no

## 2024-09-09 NOTE — ANESTHESIA POSTPROCEDURE EVALUATION
Patient: Nadya Cisneros    Procedure Summary       Date: 24 [facility]   [facility] LABOR DELIVERY    Anesthesia Start: 914 Anesthesia Stop:     Procedure:  SECTION REPEAT (Abdomen) Diagnosis:       History of  section      (History of  section [Z98.891])    Surgeons: Ed Garcia MD Provider: Camryn Wild DO    Anesthesia Type: spinal, ITN ASA Status: 2            Anesthesia Type: spinal, ITN    Vitals  Vitals Value Taken Time   /80    Temp 97.6    Pulse 81 24 1023   Resp 18    SpO2 100 % 24 1023   Vitals shown include unfiled device data.        Post Anesthesia Care and Evaluation    Patient location during evaluation: bedside  Patient participation: complete - patient participated  Level of consciousness: awake and alert  Pain score: 0  Pain management: adequate    Airway patency: patent  Anesthetic complications: No anesthetic complications    Cardiovascular status: acceptable  Respiratory status: acceptable  Hydration status: acceptable

## 2024-09-09 NOTE — OP NOTE
Daron Cisneros  : 1990  MRN: [id number]  CSN: [id number]    Operative Report    Pre-Operative Dx:   Intrauterine pregnancy at 39w0d weeks   Previous  section - declines       Postoperative dx:    Same as above  Double-footling Breech     Type of Delivery:  Repeat  (LTCS) with two-Layer Closure       Surgeon: Ed Garcia MD    Assistant: Tammy Andres MD was responsible for performing the following activities: Retraction, Suction, Irrigation, Suturing, Closing, and Delivery of Fetus and their skilled assistance was necessary for the success of this case.         Anesthesia: Spinal        EBL: 800 mls.       Antibiotics: cefazolin 2 gms     Drains: Blanca     Findings:             Infant:Male         Apgar Scores:8         Weight:4371    Indication:                 This patient is a 33-year-old  3 para 1 admitted for elective repeat  section after an essentially benign prenatal course            Procedure Details:   After the appropriate time out, the patient was prepped and draped in the usual sterile fashion.  She had been placed in the dorsal supine left lateral tilt position.  A blanca catheter had been placed in the bladder for drainage during the procedure. A pfannenstiel skin incision was made excising the previous surgical scar, with a knife and carried down to the fascia.  The fascia was nicked with a scalpel and the incision was extended bilaterally with curved Townsend scissors. The superior aspect of the fascia was grasped with Kocher clamps x 2 and bluntly as well as sharply dissected away from the underlying rectus muscles.  A similar process was repeated inferiorly. The rectus muscles were  and the peritoneal cavity was entered sharply without complications. The bladder flap was created with Metzenbaum scissors and pickups with teeth.  The  retractor was placed and after checking for no entrapment, was  retracted down.  A transverse uterine incision was made with the knife and extended bilaterally.  The infant was delivered from the double footling presentation.  The mouth and nose were bulb suctioned.  The cord was doubly clamped and cut and the infant handed to the pediatric nurse in attendance.  Cord blood was obtained.  The placenta was manually extracted from the uterus.  The uterus was then elevated from the abdomen and wiped free of remaining membranes.  The uterine incision was closed with #1 chromic in a 2 layer running locking fashion.  The uterus had been returned to the abdomen.  The lateral gutters were wiped free of remaining clots.  The site of surgical incision was inspected and noted to be hemostatic. The  retractor was removed.  Interceed was placed over the uterine incision. The subfascial tissue was inspected and noted to be hemostatic.  The peritoneum was closed with #1 Chronic in a running continuous fashion. The fascia was closed with 0 Vicryl in a running non-locking fashion in two segments.  Subcutaneous tissue was inspected and noted to be hemostatic with minimal bovie electrocautery.  Ree's fascia was closed with 3-0 Plain in a running non-locking fashion.  The skin was approximated with Subcutaneous absorbable staples. Dressings were placed.  All instrument and sponge counts were correct at the end of the procedure. The patient tolerated the procedure well.    She was taken to postoperative recovery room in stable condition.          Complications:   None     Pathology: None     Disposition:   Mother to Mother Baby/Postpartum  in stable condition currently.   Baby to NICU OBS  in stable condition currently.     Ed Garcia MD   2024  10:40 EDT

## 2024-09-09 NOTE — LACTATION NOTE
09/09/24 1438   Maternal Information   Date of Referral 09/09/24   Person Making Referral lactation consultant   Maternal Reason for Referral   (Courtesy visit for new delivery. Hx: BF 1st child for 21mos. Mother asked if she could supplement with formula if she doesn't pump enough milk. Educ on 2 means of supplementing either with formula or donor breast milk if she desires.)   Maternal Assessment   Breast Shape Bilateral:;round   Breast Density Bilateral:;soft   Nipples Bilateral:;short   Left Nipple Symptoms intact;nontender   Right Nipple Symptoms intact;nontender   Maternal Infant Feeding   Maternal Emotional State receptive;anxious   Latch Assistance   (infant currently in NICU OBS.)   Support Person Involvement actively supporting mother  (Pts mother at bedside & FOB upstairs in NICU)   Milk Expression/Equipment   Breast Pump Type double electric, hospital grade  (I set up hosp pump for patient for her to pump q3hrs until infant is back from NICU. Pt to pump for short or missed feedings or when supplement is given.)

## 2024-09-09 NOTE — H&P
"[facility]  Obstetric History and Physical    Chief Complaint   Patient presents with    Scheduled        Subjective     Patient is a 33 y.o. female  currently at 39w0d, who presents for elective repeat  section.    Her prenatal care is complicated by  prior   desires repeat .  Her previous obstetric/gynecological history is noted for is remarkable for infertility, preeclampsia, and  delivery. She was on low-dose ASA from 12-36 weeks with this pregnancy.    The following portions of the patients history were reviewed and updated as appropriate: current medications, allergies, past medical history, past surgical history, past family history, past social history, and problem list .       Prenatal Information:   Maternal Prenatal Labs  Blood Type ABO Type   Date Value Ref Range Status   2024 O  Final      Rh Status RH type   Date Value Ref Range Status   2024 Positive  Final      Antibody Screen Antibody Screen   Date Value Ref Range Status   2024 Negative  Final      Gonnorhea No results found for: \"GCCX\"   Chlamydia No results found for: \"CLAMYDCU\"   RPR No results found for: \"RPR\"   Syphilis Antibody No results found for: \"SYPHILIS\"   Rubella No results found for: \"RUBELLAIGGIN\", \"RUBELLAABIGG\"   Hepatitis B Surface Antigen No results found for: \"HEPBSAG\"   HIV-1 Antibody No results found for: \"LABHIV1\"   Hepatitis C Antibody No results found for: \"HEPCAB\"   Rapid Urin Drug Screen No results found for: \"AMPMETHU\", \"BARBITSCNUR\", \"LABBENZSCN\", \"LABMETHSCN\", \"LABOPIASCN\", \"THCURSCR\", \"COCAINEUR\", \"COCSCRUR\", \"AMPHETSCREEN\", \"PROPOXSCN\", \"BUPRENORSCNU\", \"METHAMPSCNUR\", \"OXYCODONESCN\", \"TRICYCLICSCN\"   Group B Strep Culture No results found for: \"GBSANTIGEN\"           External Prenatal Results       Pregnancy Outside Results - Transcribed From Office Records - See Scanned Records For Details       Test Value Date Time    ABO  O  24 1047    Rh  " Positive  24 1047    Antibody Screen  Negative  24 1047       Negative  24 1646    Varicella IgG       Rubella  4.31 index 24 1646    Hgb  11.8 g/dL 24 1047       12.3 g/dL 24 1646    Hct  35.4 % 24 1047       35.6 % 24 1646    HgB A1c   5.50 % 24 1646    1h GTT  105 mg/dL 24 1306    3h GTT Fasting       3h GTT 1 hour       3h GTT 2 hour       3h GTT 3 hour        Gonorrhea (discrete)       Chlamydia (discrete)       RPR  Non-Reactive  24 1646    Syphils cascade: TP-Ab (FTA)  Non-Reactive  24 1047    TP-Ab       TP-Ab (EIA)       TPPA       HBsAg  Non-Reactive  24 1646    Herpes Simplex Virus PCR       Herpes Simplex VIrus Culture       HIV  Non-Reactive  24 1646    Hep C RNA Quant PCR       Hep C Antibody  Non-Reactive  24 1646    AFP       NIPT       Cystic Fibroisis        Group B Strep       GBS Susceptibility to Clindamycin       GBS Susceptibility to Erythromycin       Fetal Fibronectin       Genetic Testing, Maternal Blood                 Drug Screening       Test Value Date Time    Urine Drug Screen       Amphetamine Screen  Negative  24 1209    Barbiturate Screen  Negative  24 1209    Benzodiazepine Screen  Positive  24 1209    Methadone Screen  Negative  24 1209    Phencyclidine Screen  Negative  24 1209    Opiates Screen  Negative  24 1209    THC Screen  Negative  24 1209    Cocaine Screen       Propoxyphene Screen       Buprenorphine Screen  Negative  24 1209    Methamphetamine Screen       Oxycodone Screen  Negative  24 1209    Tricyclic Antidepressants Screen  Negative  24 1209              Legend    ^: Historical                              Past OB History:       OB History    Para Term  AB Living   3 1 1 0 1 1   SAB IAB Ectopic Molar Multiple Live Births   1 0 0 0 0 1      # Outcome Date GA Lbr Damian/2nd Weight Sex Type Anes PTL Lv   3  Current            2 Term 2022 42w0d  4167 g (9 lb 3 oz) F CS-Unspec EPI  JUAN      Birth Comments: IVF pregnancy, preeclampsia, placenta accreta w/ postpartum D&C      Complications: Preeclampsia   1 2020 11w0d       FD      Obstetric Comments   Fob#1 pregnancy #1-3      G2: preeclampsia;  placenta accreta C/S with D&C, IVF       Past Medical History: Past Medical History:   Diagnosis Date    ADD (attention deficit disorder)     Anxiety     buspar and prozac    History of infertility     History of placenta accreta     with postpartum D&C    Preeclampsia     G2    UTI (urinary tract infection)       Past Surgical History Past Surgical History:   Procedure Laterality Date    BREAST SURGERY      IMPLANTS     SECTION  2022    DILATATION AND CURETTAGE N/A     Placenta Accreta postpartum D&C      Family History: History reviewed. No pertinent family history.   Social History:  reports that she has never smoked. She has never used smokeless tobacco.   reports no history of alcohol use.   reports no history of drug use.                   General ROS Negative Findings:Headaches, Visual Changes, Epigastric pain, Anorexia, Nausia/Vomiting, ROM, and Vaginal Bleeding    ROS      Objective       Vital Signs Range for the last 24 hours  Temperature: Temp:  [97.5 °F (36.4 °C)] 97.5 °F (36.4 °C)   Temp Source: Temp src: Oral   BP: BP: (126)/(95) 126/95   Pulse: Heart Rate:  [96] 96   Respirations: Resp:  [18] 18   SPO2: SpO2:  [98 %] 98 %   O2 Amount (l/min):     O2 Devices     Weight: Weight:  [76.4 kg (168 lb 8 oz)] 76.4 kg (168 lb 8 oz)     Physical Examination:   General:   alert, appears stated age, and cooperative   Skin:   normal   HEENT:     Lungs:   clear to auscultation bilaterally   Heart:   regular rate and rhythm, S1, S2 normal, no murmur, click, rub or gallop   Abdomen:  soft, non-tender; bowel sounds normal; no masses,  no organomegaly   Lower Extremeties    Pelvis:  Exam deferred.          Presentation: vertex   Cervix: Exam by:     Dilation:     Effacement:     Station:         Fetal Heart Rate Assessment   Method:     Beats/min:     Baseline:     Variability:     Accels:     Decels:     Tracing Category:       Uterine Assessment   Method:     Frequency (min):     Ctx Count in 10 min:     Duration:     Intensity:     Intensity by IUPC:     Resting Tone:     Resting Tone by IUPC:     [place] Units:       Laboratory Results:   Lab Results (last 24 hours)       ** No results found for the last 24 hours. **          Radiology Review:  Imaging Results (Last 24 Hours)       ** No results found for the last 24 hours. **          Other Studies:          Assessment:  1.  Intrauterine pregnancy at 39w0d weeks gestation with reactive fetal status.    2.  Hx of  section, declines TOLAC  3.  Hx of preeclampsia       Plan:  1. Repeat   2. Plan of care has been reviewed with patient.  3.  Risks, benefits of treatment plan have been discussed.  4.  All questions have been answered.  5      Ed Garcia MD  2024  08:56 EDT

## 2024-09-09 NOTE — PLAN OF CARE
Goal Outcome Evaluation:      Pt doi  Problem: Skin Injury Risk Increased  Goal: Skin Health and Integrity  Outcome: Ongoing, Progressing     Problem: Adjustment to Role Transition (Postpartum  Delivery)  Goal: Successful Maternal Role Transition  Outcome: Ongoing, Progressing     Problem: Bleeding (Postpartum  Delivery)  Goal: Hemostasis  Outcome: Ongoing, Progressing     Problem: Infection (Postpartum  Delivery)  Goal: Absence of Infection Signs and Symptoms  Outcome: Ongoing, Progressing     Problem: Pain (Postpartum  Delivery)  Goal: Acceptable Pain Control  Outcome: Ongoing, Progressing     Problem: Postoperative Nausea and Vomiting (Postpartum  Delivery)  Goal: Nausea and Vomiting Relief  Outcome: Ongoing, Progressing     Problem: Postoperative Urinary Retention (Postpartum  Delivery)  Goal: Effective Urinary Elimination  Outcome: Ongoing, Progressing   ng well, pain controlled.

## 2024-09-09 NOTE — PAYOR COMM NOTE
"Amelia Nadya Bárbara (33 y.o. Female)     From:Milana Ocasio LPN, Utilization Review  Phone #[phone]  Fax #[phone]       Auth#[id number]     Delivery Information.          Date of Birth   1990    Social Security Number       Address   [address] [place] [address] [place] [address]    Home Phone   [phone]    MRN   [id number]       Caodaism   Denominational    Marital Status                               Admission Date   24    Admission Type   Elective    Admitting Provider   Ed Garcia MD    Attending Provider   Ed Garcia MD    Department, Room/Bed   [facility] 4A, N420/1       Discharge Date       Discharge Disposition       Discharge Destination                                 Attending Provider: Ed Garcia MD    Allergies: Pregabalin    Isolation: None   Infection: None   Code Status: CPR    Ht: 162.6 cm (64\")   Wt: 76.4 kg (168 lb 8 oz)    Admission Cmt: None   Principal Problem: History of  section [Z98.891]                   Active Insurance as of 2024       Primary Coverage       Payor Plan Insurance Group Employer/Plan Group    ANTHEM BLUE CROSS ANTHEM BLUE CROSS BLUE SHIELD PPO [id number]       Payor Plan Address Payor Plan Phone Number Payor Plan Fax Number Effective Dates    [address] [phone]  2021 - None Entered    [place] [address]         Subscriber Name Subscriber Birth Date Member ID       CLIFF SEGOVIA 1982 [id number]                     Emergency Contacts        (Rel.) Home Phone Work Phone Mobile Phone    CLIFF MICHAUD (Spouse) [phone] -- [phone]              Insurance Information                  ANTHEM BLUE CROSS/ANTHEM BLUE CROSS BLUE SHIELD PPO Phone: [phone]    Subscriber: Cliff Segovia Subscriber#: [id number]    Group#: [id number] Precert#: --             History & Physical        Ed Garcia MD at 24 0856        " "  [facility]  Obstetric History and Physical    Chief Complaint   Patient presents with    Scheduled        Subjective    Patient is a 33 y.o. female  currently at 39w0d, who presents for elective repeat  section.    Her prenatal care is complicated by  prior   desires repeat .  Her previous obstetric/gynecological history is noted for is remarkable for infertility, preeclampsia, and  delivery. She was on low-dose ASA from 12-36 weeks with this pregnancy.    The following portions of the patients history were reviewed and updated as appropriate: current medications, allergies, past medical history, past surgical history, past family history, past social history, and problem list .       Prenatal Information:   Maternal Prenatal Labs  Blood Type ABO Type   Date Value Ref Range Status   2024 O  Final      Rh Status RH type   Date Value Ref Range Status   2024 Positive  Final      Antibody Screen Antibody Screen   Date Value Ref Range Status   2024 Negative  Final      Gonnorhea No results found for: \"GCCX\"   Chlamydia No results found for: \"CLAMYDCU\"   RPR No results found for: \"RPR\"   Syphilis Antibody No results found for: \"SYPHILIS\"   Rubella No results found for: \"RUBELLAIGGIN\", \"RUBELLAABIGG\"   Hepatitis B Surface Antigen No results found for: \"HEPBSAG\"   HIV-1 Antibody No results found for: \"LABHIV1\"   Hepatitis C Antibody No results found for: \"HEPCAB\"   Rapid Urin Drug Screen No results found for: \"AMPMETHU\", \"BARBITSCNUR\", \"LABBENZSCN\", \"LABMETHSCN\", \"LABOPIASCN\", \"THCURSCR\", \"COCAINEUR\", \"COCSCRUR\", \"AMPHETSCREEN\", \"PROPOXSCN\", \"BUPRENORSCNU\", \"METHAMPSCNUR\", \"OXYCODONESCN\", \"TRICYCLICSCN\"   Group B Strep Culture No results found for: \"GBSANTIGEN\"           External Prenatal Results       Pregnancy Outside Results - Transcribed From Office Records - See Scanned Records For Details       Test Value Date Time    ABO  O  24 1047    Rh  " Positive  24 1047    Antibody Screen  Negative  24 1047       Negative  24 1646    Varicella IgG       Rubella  4.31 index 24 1646    Hgb  11.8 g/dL 24 1047       12.3 g/dL 24 1646    Hct  35.4 % 24 1047       35.6 % 24 1646    HgB A1c   5.50 % 24 1646    1h GTT  105 mg/dL 24 1306    3h GTT Fasting       3h GTT 1 hour       3h GTT 2 hour       3h GTT 3 hour        Gonorrhea (discrete)       Chlamydia (discrete)       RPR  Non-Reactive  24 1646    Syphils cascade: TP-Ab (FTA)  Non-Reactive  24 1047    TP-Ab       TP-Ab (EIA)       TPPA       HBsAg  Non-Reactive  24 1646    Herpes Simplex Virus PCR       Herpes Simplex VIrus Culture       HIV  Non-Reactive  24 1646    Hep C RNA Quant PCR       Hep C Antibody  Non-Reactive  24 1646    AFP       NIPT       Cystic Fibroisis        Group B Strep       GBS Susceptibility to Clindamycin       GBS Susceptibility to Erythromycin       Fetal Fibronectin       Genetic Testing, Maternal Blood                 Drug Screening       Test Value Date Time    Urine Drug Screen       Amphetamine Screen  Negative  24 1209    Barbiturate Screen  Negative  24 1209    Benzodiazepine Screen  Positive  24 1209    Methadone Screen  Negative  24 1209    Phencyclidine Screen  Negative  24 1209    Opiates Screen  Negative  24 1209    THC Screen  Negative  24 1209    Cocaine Screen       Propoxyphene Screen       Buprenorphine Screen  Negative  24 1209    Methamphetamine Screen       Oxycodone Screen  Negative  24 1209    Tricyclic Antidepressants Screen  Negative  24 1209              Legend    ^: Historical                              Past OB History:       OB History    Para Term  AB Living   3 1 1 0 1 1   SAB IAB Ectopic Molar Multiple Live Births   1 0 0 0 0 1      # Outcome Date GA Lbr Damian/2nd Weight Sex Type Anes PTL Lv   3  Current            2 Term 2022 42w0d  4167 g (9 lb 3 oz) F CS-Unspec EPI  JUAN      Birth Comments: IVF pregnancy, preeclampsia, placenta accreta w/ postpartum D&C      Complications: Preeclampsia   1 2020 11w0d       FD      Obstetric Comments   Fob#1 pregnancy #1-3      G2: preeclampsia;  placenta accreta C/S with D&C, IVF       Past Medical History: Past Medical History:   Diagnosis Date    ADD (attention deficit disorder)     Anxiety     buspar and prozac    History of infertility     History of placenta accreta     with postpartum D&C    Preeclampsia     G2    UTI (urinary tract infection)       Past Surgical History Past Surgical History:   Procedure Laterality Date    BREAST SURGERY      IMPLANTS     SECTION  2022    DILATATION AND CURETTAGE N/A     Placenta Accreta postpartum D&C      Family History: History reviewed. No pertinent family history.   Social History:  reports that she has never smoked. She has never used smokeless tobacco.   reports no history of alcohol use.   reports no history of drug use.                   General ROS Negative Findings:Headaches, Visual Changes, Epigastric pain, Anorexia, Nausia/Vomiting, ROM, and Vaginal Bleeding    ROS      Objective      Vital Signs Range for the last 24 hours  Temperature: Temp:  [97.5 °F (36.4 °C)] 97.5 °F (36.4 °C)   Temp Source: Temp src: Oral   BP: BP: (126)/(95) 126/95   Pulse: Heart Rate:  [96] 96   Respirations: Resp:  [18] 18   SPO2: SpO2:  [98 %] 98 %   O2 Amount (l/min):     O2 Devices     Weight: Weight:  [76.4 kg (168 lb 8 oz)] 76.4 kg (168 lb 8 oz)     Physical Examination:   General:   alert, appears stated age, and cooperative   Skin:   normal   HEENT:     Lungs:   clear to auscultation bilaterally   Heart:   regular rate and rhythm, S1, S2 normal, no murmur, click, rub or gallop   Abdomen:  soft, non-tender; bowel sounds normal; no masses,  no organomegaly   Lower Extremeties    Pelvis:  Exam deferred.          Presentation: vertex   Cervix: Exam by:     Dilation:     Effacement:     Station:         Fetal Heart Rate Assessment   Method:     Beats/min:     Baseline:     Variability:     Accels:     Decels:     Tracing Category:       Uterine Assessment   Method:     Frequency (min):     Ctx Count in 10 min:     Duration:     Intensity:     Intensity by IUPC:     Resting Tone:     Resting Tone by IUPC:     Bola Units:       Laboratory Results:   Lab Results (last 24 hours)       ** No results found for the last 24 hours. **          Radiology Review:  Imaging Results (Last 24 Hours)       ** No results found for the last 24 hours. **          Other Studies:          Assessment:  1.  Intrauterine pregnancy at 39w0d weeks gestation with reactive fetal status.    2.  Hx of  section, declines TOLAC  3.  Hx of preeclampsia       Plan:  1. Repeat   2. Plan of care has been reviewed with patient.  3.  Risks, benefits of treatment plan have been discussed.  4.  All questions have been answered.  5      Ed Garcia MD  2024  08:56 EDT    Electronically signed by Ed Garcia MD at 24 0859       Facility-Administered Medications as of 2024   Medication Dose Route Frequency Provider Last Rate Last Admin    [COMPLETED] acetaminophen (TYLENOL) tablet 1,000 mg  1,000 mg Oral Once Ed Garcia MD   1,000 mg at 24 0838    acetaminophen (TYLENOL) tablet 1,000 mg  1,000 mg Oral Q6H Ed Garcia MD   1,000 mg at 24 1350    Followed by    [START ON 9/10/2024] acetaminophen (TYLENOL) tablet 650 mg  650 mg Oral Q6H Ed Garcia MD        aluminum-magnesium hydroxide-simethicone (MAALOX MAX) 400-400-40 MG/5ML suspension 15 mL  15 mL Oral Q4H PRN Ed Garcia MD        Or    calcium carbonate (TUMS) chewable tablet 500 mg (200 mg elemental)  1 tablet Oral Q4H PRN Ed Garcia MD        busPIRone (BUSPAR) tablet 5 mg  5 mg Oral Q12H Jose  Ed US MD        carboprost (HEMABATE) injection 250 mcg  250 mcg Intramuscular PRN Ed Garcia MD        [COMPLETED] ceFAZolin 2000 mg IVPB in 100 mL NS (MBP)  2,000 mg Intravenous Once Ed Garcia MD   2,000 mg at 09/09/24 0905    docusate sodium (COLACE) capsule 100 mg  100 mg Oral BID PRN Ed Garcia MD        FLUoxetine (PROzac) capsule 20 mg  20 mg Oral Daily Ed Garcia MD        Hydrocortisone (Perianal) (ANUSOL-HC) 2.5 % rectal cream 1 Application  1 Application Rectal PRN Ed Garcia MD        ketorolac (TORADOL) injection 15 mg  15 mg Intravenous Q6H Ed Garcia MD        Followed by    [START ON 9/10/2024] ibuprofen (ADVIL,MOTRIN) tablet 600 mg  600 mg Oral Q6H Ed Garcia MD        [COMPLETED] ketorolac (TORADOL) injection 30 mg  30 mg Intravenous Once Ed Garcia MD   30 mg at 09/09/24 1113    [COMPLETED] lactated ringers bolus 1,000 mL  1,000 mL Intravenous Once Ed Garcia MD 4,000 mL/hr at 09/09/24 0821 1,000 mL at 09/09/24 0821    lanolin topical 1 Application  1 Application Topical Q1H PRN Ed Garcia MD        Measles, Mumps & Rubella Vac (MMR) injection 0.5 mL  0.5 mL Subcutaneous During Hospitalization Ed Garcia MD        methylergonovine (METHERGINE) injection 200 mcg  200 mcg Intramuscular PRN Ed Garcia MD        miSOPROStol (CYTOTEC) tablet 600 mcg  600 mcg Oral PRN Ed Garcia MD        nicotine (NICODERM CQ) 21 MG/24HR patch 1 patch  1 patch Transdermal Q24H Ed Garcia MD        nicotine polacrilex (NICORETTE) gum 4 mg  4 mg Mouth/Throat Q1H PRN Ed Garcia MD        [COMPLETED] NIFEdipine (PROCARDIA) capsule 20 mg  20 mg Oral Once Ed Garcia MD   20 mg at 09/09/24 1351    oxyCODONE (ROXICODONE) immediate release tablet 5 mg  5 mg Oral Q4H PRN Ed Garcia MD        Or    oxyCODONE (ROXICODONE) immediate release tablet 10 mg  10 mg Oral Q4H PRN  "Ed Garcia MD        oxytocin (PITOCIN) 30 units in 0.9% sodium chloride 500 mL (premix)  125 mL/hr Intravenous Once PRN Ed Garcia MD        promethazine (PHENERGAN) tablet 25 mg  25 mg Oral Q6H PRN Ed Garcia MD        Or    promethazine (PHENERGAN) suppository 12.5 mg  12.5 mg Rectal Q6H PRN Ed Garcia MD        simethicone (MYLICON) chewable tablet 80 mg  80 mg Oral 4x Daily PRN Ed Garcia MD        [COMPLETED] Sod Citrate-Citric Acid (BICITRA) oral solution 30 mL  30 mL Oral Once Ed Garcia MD   30 mL at 09/09/24 0906    sodium chloride 0.9 % flush 3 mL  3 mL Intravenous Q12H Ed Garcia MD        sodium chloride 0.9 % flush 3-10 mL  3-10 mL Intravenous PRN Ed Garcia MD        sodium chloride 0.9 % infusion 40 mL  40 mL Intravenous PRN Ed Garcia MD         Orders (last 24 hrs)        Start     Ordered    09/10/24 1700  ibuprofen (ADVIL,MOTRIN) tablet 600 mg  Every 6 Hours        Placed in \"Followed by\" Linked Group    09/09/24 1314    09/10/24 1400  acetaminophen (TYLENOL) tablet 650 mg  Every 6 Hours        Placed in \"Followed by\" Linked Group    09/09/24 1314    09/10/24 0600  Discontinue Indwelling Urinary Catheter in AM  Once         09/09/24 1314    09/10/24 0600  Hemoglobin & Hematocrit, Blood  Timed         09/09/24 1314    09/10/24 0000  Discontinue IV  Once         09/09/24 1314    09/09/24 1800  Ambulate Patient  2 Times Daily      Comments: After anesthesia wears off.    09/09/24 1314    09/09/24 1700  ketorolac (TORADOL) injection 15 mg  Every 6 Hours        Placed in \"Followed by\" Linked Group    09/09/24 1314    09/09/24 1430  NIFEdipine (PROCARDIA) capsule 20 mg  Once         09/09/24 1339    09/09/24 1400  busPIRone (BUSPAR) tablet 5 mg  Every 12 Hours Scheduled         09/09/24 1314    09/09/24 1400  FLUoxetine (PROzac) capsule 20 mg  Daily         09/09/24 1314    09/09/24 1400  Incentive spirometry RT  Every " Hour       09/09/24 1314    09/09/24 1400  sodium chloride 0.9 % flush 3 mL  Every 12 Hours Scheduled         09/09/24 1314    09/09/24 1400  nicotine (NICODERM CQ) 21 MG/24HR patch 1 patch  Every 24 Hours         09/09/24 1314    09/09/24 1340  CBC (No Diff)  STAT         09/09/24 1339    09/09/24 1340  Preeclampsia Panel  STAT         09/09/24 1339    09/09/24 1315  Transfer Patient  Once         09/09/24 1314    09/09/24 1315  Code Status and Medical Interventions: CPR (Attempt to Resuscitate); Full  Continuous         09/09/24 1314    09/09/24 1315  Vital Signs Per hospital policy  Per Hospital Policy         09/09/24 1314    09/09/24 1315  Notify Physician  Until Discontinued         09/09/24 1314    09/09/24 1315  Patient May Shower  Once        Comments: After anesthesia wears off and with assistance    09/09/24 1314    09/09/24 1315  Diet: Regular/House; Fluid Consistency: Thin (IDDSI 0)  Diet Effective Now         09/09/24 1314    09/09/24 1315  Advance Diet As Tolerated -Regular  Until Discontinued         09/09/24 1314    09/09/24 1315  I/O  Every Shift       09/09/24 1314    09/09/24 1315  Fundal and Lochia Check  Per Hospital Policy        Comments: Q 30 min x 2, Q 1 hr x 4, Q 4 hrs x 24 hrs, then Q shift.    09/09/24 1314    09/09/24 1315  Weigh Patient  Once         09/09/24 1314    09/09/24 1315  Continue Indwelling Urinary Catheter Already in Place  Once         09/09/24 1314    09/09/24 1315  Notify Provider if Bladder Distention Continues  Until Discontinued         09/09/24 1314    09/09/24 1315  Urinary Catheter Care  Every Shift       09/09/24 1314    09/09/24 1315  Turn Cough Deep Breathe  Once         09/09/24 1314    09/09/24 1315  Encourage early intake of PO fluids  Continuous         09/09/24 1314    09/09/24 1315  Ambulate Patient 3-5 times per day (with or without Farah)  Every Shift       09/09/24 1314    09/09/24 1315  Apply Abdominal Binding Until Discontinued  Until Discontinued      "    09/09/24 1314    09/09/24 1315  \"If patient tolerates food and liquids after completion of second bag of Pitocin, saline lock IV and discontinue IV fluid infusions.  Once         09/09/24 1314    09/09/24 1315  Breast pump to bed  Once         09/09/24 1314    09/09/24 1315  If indicated -- Please administer RH Immunoglobulin based on results of cord blood evaluation and fetal screen lab tests, pharmacy to dispense  Per Order Details        Comments: See Process Instructions For Reference Range Details.    09/09/24 1314    09/09/24 1315  Insert Peripheral IV  Once         09/09/24 1314    09/09/24 1315  Saline Lock & Maintain IV Access  Continuous         09/09/24 1314    09/09/24 1315  Place Sequential Compression Device  Once         09/09/24 1314    09/09/24 1315  Maintain Sequential Compression Device  Continuous         09/09/24 1314    09/09/24 1315  VTE Prophylaxis Not Indicated: Low Risk; No Risk Factors (0)  Once         09/09/24 1314    09/09/24 1314  sodium chloride 0.9 % flush 3-10 mL  As Needed         09/09/24 1314    09/09/24 1314  sodium chloride 0.9 % infusion 40 mL  As Needed         09/09/24 1314    09/09/24 1314  oxytocin (PITOCIN) 30 units in 0.9% sodium chloride 500 mL (premix)  Once As Needed         09/09/24 1314    09/09/24 1314  docusate sodium (COLACE) capsule 100 mg  2 Times Daily PRN         09/09/24 1314    09/09/24 1314  simethicone (MYLICON) chewable tablet 80 mg  4 Times Daily PRN         09/09/24 1314    09/09/24 1314  promethazine (PHENERGAN) tablet 25 mg  Every 6 Hours PRN        Placed in \"Or\" Linked Group    09/09/24 1314    09/09/24 1314  promethazine (PHENERGAN) suppository 12.5 mg  Every 6 Hours PRN        Placed in \"Or\" Linked Group    09/09/24 1314    09/09/24 1314  nicotine polacrilex (NICORETTE) gum 4 mg  Every 1 Hour PRN         09/09/24 1314    09/09/24 1314  lanolin topical 1 Application  Every 1 Hour PRN         09/09/24 1314    09/09/24 1314  carboprost " "(HEMABATE) injection 250 mcg  As Needed         09/09/24 1314    09/09/24 1314  miSOPROStol (CYTOTEC) tablet 600 mcg  As Needed         09/09/24 1314    09/09/24 1314  methylergonovine (METHERGINE) injection 200 mcg  As Needed         09/09/24 1314    09/09/24 1314  Hydrocortisone (Perianal) (ANUSOL-HC) 2.5 % rectal cream 1 Application  As Needed         09/09/24 1314    09/09/24 1314  aluminum-magnesium hydroxide-simethicone (MAALOX MAX) 400-400-40 MG/5ML suspension 15 mL  Every 4 Hours PRN        Placed in \"Or\" Linked Group    09/09/24 1314    09/09/24 1314  calcium carbonate (TUMS) chewable tablet 500 mg (200 mg elemental)  Every 4 Hours PRN        Placed in \"Or\" Linked Group    09/09/24 1314    09/09/24 1314  influenza vaccine 0.5 mL  During Hospitalization,   Status:  Discontinued         09/09/24 1314    09/09/24 1314  Measles, Mumps & Rubella Vac (MMR) injection 0.5 mL  During Hospitalization         09/09/24 1314    09/09/24 1314  acetaminophen (TYLENOL) tablet 1,000 mg  Every 6 Hours        Placed in \"Followed by\" Linked Group    09/09/24 1314    09/09/24 1314  oxyCODONE (ROXICODONE) immediate release tablet 5 mg  Every 4 Hours PRN        Placed in \"Or\" Linked Group    09/09/24 1314    09/09/24 1314  oxyCODONE (ROXICODONE) immediate release tablet 10 mg  Every 4 Hours PRN        Placed in \"Or\" Linked Group    09/09/24 1314    09/09/24 1145  oxytocin (PITOCIN) 30 units in 0.9% sodium chloride 500 mL (premix)  Once,   Status:  Discontinued        Placed in \"Followed by\" Linked Group    09/09/24 1053    09/09/24 1145  oxytocin (PITOCIN) 30 units in 0.9% sodium chloride 500 mL (premix)  Once,   Status:  Discontinued        Placed in \"Followed by\" Linked Group    09/09/24 1053    09/09/24 1145  ketorolac (TORADOL) injection 30 mg  Once         09/09/24 1054    09/09/24 1054  IV Site Care  Continuous         09/09/24 1054    09/09/24 1054  Oxygen Therapy- Nasal Cannula; 2 LPM  Continuous,   Status:  Canceled      "    09/09/24 1054    09/09/24 1054  Respirations  Continuous        Comments: If respiratory rate is less than 10/min, notify the Anesthesiologist    09/09/24 1054    09/09/24 1054  If respiratory is less than 8/min, see Narcan order below. Notify Anesthesiologist STAT.  Continuous         09/09/24 1054    09/09/24 1054  Blood Pressure and Pulse every 4 hours  Continuous,   Status:  Canceled         09/09/24 1054    09/09/24 1054  Activity and removal of blanca catheter, per Obstetrician, on routine post-operative orders  Continuous,   Status:  Canceled         09/09/24 1054    09/09/24 1054  Notify Anesthesiologist for any questions/problems  Continuous,   Status:  Canceled         09/09/24 1054    09/09/24 1054  Notify Anesthesia for temp over 101.4F  Continuous,   Status:  Canceled         09/09/24 1054    09/09/24 1054  Ambu Bag at Bedside  Continuous         09/09/24 1054    09/09/24 1054  Notify Provider (Specified)  Continuous        Comments: Open Order Report to View Parameters Requiring Provider Notification    09/09/24 1053    09/09/24 1054  Vital Signs Per Hospital Policy  Per Hospital Policy         09/09/24 1053    09/09/24 1054  Strict Bed Rest  Until Discontinued         09/09/24 1053    09/09/24 1054  Fundal & Lochia Check  Per Order Details        Comments: Every 15 Minutes x4, Then Every 30 Minutes x2, Then Every Shift    09/09/24 1053    09/09/24 1054  Diet: Regular/House; Fluid Consistency: Thin (IDDSI 0)  Diet Effective Now,   Status:  Canceled         09/09/24 1053    09/09/24 1053  ondansetron (ZOFRAN) injection 4 mg  Once As Needed,   Status:  Discontinued         09/09/24 1054    09/09/24 1053  fentaNYL citrate (PF) (SUBLIMAZE) injection 50 mcg  Every 5 Minutes PRN,   Status:  Discontinued         09/09/24 1054    09/09/24 1053  Fundal & Lochia Check  Every Shift       09/09/24 1053    09/09/24 1053  methylergonovine (METHERGINE) injection 200 mcg  Once As Needed,   Status:  Discontinued          09/09/24 1054    09/09/24 1053  carboprost (HEMABATE) injection 250 mcg  Every 15 Minutes PRN,   Status:  Discontinued         09/09/24 1054    09/09/24 1053  miSOPROStol (CYTOTEC) tablet 800 mcg  Once As Needed,   Status:  Discontinued         09/09/24 1054    09/09/24 0900  sodium chloride 0.9 % flush 10 mL  Every 12 Hours Scheduled,   Status:  Discontinued         09/09/24 0748    09/09/24 0845  lactated ringers bolus 1,000 mL  Once         09/09/24 0748    09/09/24 0845  lactated ringers infusion  Continuous,   Status:  Discontinued         09/09/24 0748    09/09/24 0845  Sod Citrate-Citric Acid (BICITRA) oral solution 30 mL  Once         09/09/24 0748    09/09/24 0845  acetaminophen (TYLENOL) tablet 1,000 mg  Once         09/09/24 0748    09/09/24 0845  ceFAZolin 2000 mg IVPB in 100 mL NS (MBP)  Once         09/09/24 0748    09/09/24 0800  Vital Signs q 4 while awake  Every 4 Hours,   Status:  Canceled      Comments: While the patient is awake.    09/09/24 0748    09/09/24 0749  Admit To Obstetrics Inpatient  Once         09/09/24 0748    09/09/24 0749  Code Status and Medical Interventions:  Continuous,   Status:  Canceled         09/09/24 0748    09/09/24 0749  Continuous Fetal Monitoring With NST on Admission and Prior to Initiation of Oxytocin.  Per Order Details,   Status:  Canceled        Comments: Continuous Fetal Monitoring With NST on Admission    09/09/24 0748    09/09/24 0749  External Uterine Contraction Monitoring  Per Hospital Policy,   Status:  Canceled         09/09/24 0748    09/09/24 0749  Notify Provider (Specified)  Continuous,   Status:  Canceled        Comments: Open Order Report to View Parameters Requiring Provider Notification    09/09/24 0748    09/09/24 0749  Notify Provider of Tachysystole (Per Hospital Algorithm)  Continuous,   Status:  Canceled        Comments: Open Order Report to View Parameters Requiring Provider Notification    09/09/24 0748    09/09/24 0749  Notify Provider  "if Membranes Ruptured, Bleeding Greater Than 1 Pad Per Hour, Fetal Heart Tone Abnormality or Severe Pain  Continuous,   Status:  Canceled        Comments: Open Order Report to View Parameters Requiring Provider Notification    24  Weigh Patient Daily  Daily,   Status:  Canceled       2448    24  Initiate Group Beta Strep (GBS) Prophylaxis Protocol, If Criteria Met  Continuous,   Status:  Canceled        Comments: NO TREATMENT RECOMMENDED IF: 1) Maternal GBS Status Known Negative 2) Scheduled  Birth With Intact Membranes, Not in Labor 3) Maternal GBS Status Unknown, No Risk Factors  TREAT WITH ANTIBIOTICS IF:  1) Maternal GBS Status Known Positive 2) Maternal GBS Status Unknown With Risk Factors: a)  Previous Infant Affected By GBS Infection b) GBS Urinary Tract Infection (UTI) or Bacteriuria During Pregnancy c) Unexplained Maternal Fever (100.4F (38C) or Greater) During Labor d)  Prolonged Rupture of Membranes (18 or More Hours) e) Gestational Age Less Than 37 Weeks    24  Insert Indwelling Urinary Catheter  Once,   Status:  Canceled        Comments: Follow Protocol As Outlined in Process Instructions (Open Order Report to View Full Instructions)   Placed in \"And\" Linked Group    2448    2449  Assess Need for Indwelling Urinary Catheter - Follow Removal Protocol  Continuous,   Status:  Canceled        Comments: Follow Protocol As Outlined in Process Instructions (Open Order Report to View Full Instructions)   Placed in \"And\" Linked Group    2448    2449  Abdominal Prep With Clippers  Once,   Status:  Canceled         2448    2449  Chlorhexadine Skin Prep Unless Otherwise Indicated  Once,   Status:  Canceled         2448    2449  SCD (Sequential Compression Devices)  Once,   Status:  Canceled         2448    24 0749  POC Glucose Once  Once,   " "Status:  Canceled        Comments: Complete no more than 45 minutes prior to patient eating      09/09/24 0748    09/09/24 0749  Document Gatorade Consumption Prior to Admission (Yes or No)  Once,   Status:  Canceled         09/09/24 0748    09/09/24 0749  NPO Diet NPO Type: Ice Chips  Diet Effective Now,   Status:  Canceled         09/09/24 0748    09/09/24 0749  Insert Peripheral IV  Once,   Status:  Canceled         09/09/24 0748    09/09/24 0749  Saline Lock & Maintain IV Access  Continuous,   Status:  Canceled         09/09/24 0748    09/09/24 0748  Urinary Catheter Care  Every Shift,   Status:  Canceled      Placed in \"And\" Linked Group    09/09/24 0748    09/09/24 0748  sodium chloride 0.9 % flush 10 mL  As Needed,   Status:  Discontinued         09/09/24 0748    09/09/24 0748  sodium chloride 0.9 % infusion 40 mL  As Needed,   Status:  Discontinued         09/09/24 0748    09/09/24 0748  lidocaine PF 1% (XYLOCAINE) injection 0.5 mL  Once As Needed,   Status:  Discontinued         09/09/24 0748    Unscheduled  Blood Gas, Arterial -With Co-Ox Panel: Yes  As Needed       09/09/24 1054    Unscheduled  Up with Assistance  As Needed       09/09/24 1314    Unscheduled  Bladder Scan if Patient Unable to Void 4-6 Hours After Catheter Removal  As Needed         09/09/24 1314    Unscheduled  Straight Cath Every 4-6 Hours As Needed If Patient is Unable to Void After 4-6 Hours, Bladder Scan Volume is Greater Than 500mL & Patient Has Symptoms of Bladder Discomfort / Distention  As Needed       09/09/24 1314    Unscheduled  Schedule / Prompt Voiding For Patients With Urinary Incontinence  As Needed       09/09/24 1314    Unscheduled  Wound Care  As Needed      Comments: leave incision open to air.    09/09/24 1314    Unscheduled  Chewing Gum  As Needed       09/09/24 1314    Unscheduled  Warm compress  As Needed       09/09/24 1314    Unscheduled  Apply ace wrap, tight bra, or binder  As Needed       09/09/24 1314    " "Unscheduled  Apply ice packs  As Needed       24 1314    Signed and Held  diphenhydrAMINE (BENADRYL) capsule 25 mg  Every 4 Hours PRN        Placed in \"Or\" Linked Group    Signed and Held    Signed and Held  diphenhydrAMINE (BENADRYL) injection 25 mg  Every 4 Hours PRN        Placed in \"Or\" Linked Group    Signed and Held    Signed and Held  diphenhydrAMINE (BENADRYL) injection 25 mg  Every 4 Hours PRN        Placed in \"Or\" Linked Group    Signed and Held    Signed and Held  naloxone (NARCAN) injection 0.4 mg  Every 1 Hour PRN         Signed and Held                     Operative/Procedure Notes (last 24 hours)        Ed Garcia MD at 24 0937          [facility]  Nadya Cisneros  : 1990  MRN: [id number]  CSN: [id number]    Operative Report    Pre-Operative Dx:   Intrauterine pregnancy at 39w0d weeks   Previous  section - declines       Postoperative dx:    Same as above  Double-footling Breech     Type of Delivery:  Repeat  (LTCS) with two-Layer Closure       Surgeon: Ed Garcia MD    Assistant: Tammy Andres MD was responsible for performing the following activities: Retraction, Suction, Irrigation, Suturing, Closing, and Delivery of Fetus and their skilled assistance was necessary for the success of this case.         Anesthesia: Spinal        EBL: 800 mls.       Antibiotics: cefazolin 2 gms     Drains: Blanca     Findings:             Infant:Male         Apgar Scores:8/8/9         Weight:4371    Indication:                 This patient is a 33-year-old  3 para 1 admitted for elective repeat  section after an essentially benign prenatal course            Procedure Details:   After the appropriate time out, the patient was prepped and draped in the usual sterile fashion.  She had been placed in the dorsal supine left lateral tilt position.  A blanca catheter had been placed in the bladder for drainage during the procedure. A " pfannenstiel skin incision was made excising the previous surgical scar, with a knife and carried down to the fascia.  The fascia was nicked with a scalpel and the incision was extended bilaterally with curved Townsend scissors. The superior aspect of the fascia was grasped with Kocher clamps x 2 and bluntly as well as sharply dissected away from the underlying rectus muscles.  A similar process was repeated inferiorly. The rectus muscles were  and the peritoneal cavity was entered sharply without complications. The bladder flap was created with Metzenbaum scissors and pickups with teeth.  The  retractor was placed and after checking for no entrapment, was retracted down.  A transverse uterine incision was made with the knife and extended bilaterally.  The infant was delivered from the double footling presentation.  The mouth and nose were bulb suctioned.  The cord was doubly clamped and cut and the infant handed to the pediatric nurse in attendance.  Cord blood was obtained.  The placenta was manually extracted from the uterus.  The uterus was then elevated from the abdomen and wiped free of remaining membranes.  The uterine incision was closed with #1 chromic in a 2 layer running locking fashion.  The uterus had been returned to the abdomen.  The lateral gutters were wiped free of remaining clots.  The site of surgical incision was inspected and noted to be hemostatic. The  retractor was removed.  Interceed was placed over the uterine incision. The subfascial tissue was inspected and noted to be hemostatic.  The peritoneum was closed with #1 Chronic in a running continuous fashion. The fascia was closed with 0 Vicryl in a running non-locking fashion in two segments.  Subcutaneous tissue was inspected and noted to be hemostatic with minimal bovie electrocautery.  Ree's fascia was closed with 3-0 Plain in a running non-locking fashion.  The skin was approximated with Subcutaneous absorbable  staples. Dressings were placed.  All instrument and sponge counts were correct at the end of the procedure. The patient tolerated the procedure well.    She was taken to postoperative recovery room in stable condition.          Complications:   None     Pathology: None     Disposition:   Mother to Mother Baby/Postpartum  in stable condition currently.   Baby to NICU OBS  in stable condition currently.     Ed Garcia MD   9/9/2024  10:40 EDT    Electronically signed by Ed Garcia MD at 09/09/24 1044       Physician Progress Notes (last 24 hours)  Notes from 09/08/24 1558 through 09/09/24 1558   No notes of this type exist for this encounter.

## 2024-09-09 NOTE — ANESTHESIA PREPROCEDURE EVALUATION
Anesthesia Evaluation     Patient summary reviewed and Nursing notes reviewed   NPO Solid Status: > 8 hours  NPO Liquid Status: > 8 hours           Airway   Dental      Pulmonary - negative pulmonary ROS   Cardiovascular - negative cardio ROS        Neuro/Psych  (+) psychiatric history Anxiety and ADD  GI/Hepatic/Renal/Endo - negative ROS     Musculoskeletal (-) negative ROS    Abdominal    Substance History - negative use     OB/GYN    (+) Pregnant    Comment:   H/O preeclampsia and accreta in previous pregnancy      Other                    Anesthesia Plan    ASA 2     spinal and ITN       Anesthetic plan, risks, benefits, and alternatives have been provided, discussed and informed consent has been obtained with: patient.    CODE STATUS:    Level Of Support Discussed With: Patient  Code Status (Patient has no pulse and is not breathing): CPR (Attempt to Resuscitate)  Medical Interventions (Patient has pulse or is breathing): Full Support

## 2024-09-09 NOTE — NURSING NOTE
At 1826 RN was notified that pt had felt that her bleeding had increased. When RN arrived at bedside another unit RN and pct were at bedside. Upon assessment RN noticed that the blanca catheter was not draining properly. The blanca was then irrigated and there was no improvement. The blanca was then pulled. Pt stated that she wanted to use the bathroom. When pt made it to the restroom her bleeding increased and she stated that she was light headed and nauseous. Pt then passed out while RN and PCT were at her side. She woke back up and started vomitting. Once stable the pt was transferred back to the bed. Pts fundus was still deviated to the right so a straight cath was then performed. Output of the straight cath was approximately 15 mls. A bladder scan was then performed and showed 160 mls in the bladder. Provider on call was notified.

## 2024-09-10 LAB
ABO GROUP BLD: NORMAL
BLD GP AB SCN SERPL QL: NEGATIVE
DEPRECATED RDW RBC AUTO: 44.7 FL (ref 37–54)
ERYTHROCYTE [DISTWIDTH] IN BLOOD BY AUTOMATED COUNT: 13.6 % (ref 12.3–15.4)
HCT VFR BLD AUTO: 20.1 % (ref 34–46.6)
HGB BLD-MCNC: 6.9 G/DL (ref 12–15.9)
MCH RBC QN AUTO: 30.9 PG (ref 26.6–33)
MCHC RBC AUTO-ENTMCNC: 34.3 G/DL (ref 31.5–35.7)
MCV RBC AUTO: 90.1 FL (ref 79–97)
PLATELET # BLD AUTO: 187 10*3/MM3 (ref 140–450)
PMV BLD AUTO: 10.4 FL (ref 6–12)
RBC # BLD AUTO: 2.23 10*6/MM3 (ref 3.77–5.28)
RH BLD: POSITIVE
T&S EXPIRATION DATE: NORMAL
WBC NRBC COR # BLD AUTO: 13.12 10*3/MM3 (ref 3.4–10.8)

## 2024-09-10 PROCEDURE — 0503F POSTPARTUM CARE VISIT: CPT | Performed by: OBSTETRICS & GYNECOLOGY

## 2024-09-10 PROCEDURE — P9016 RBC LEUKOCYTES REDUCED: HCPCS

## 2024-09-10 PROCEDURE — 86923 COMPATIBILITY TEST ELECTRIC: CPT

## 2024-09-10 PROCEDURE — 86900 BLOOD TYPING SEROLOGIC ABO: CPT

## 2024-09-10 PROCEDURE — 25010000002 KETOROLAC TROMETHAMINE PER 15 MG: Performed by: OBSTETRICS & GYNECOLOGY

## 2024-09-10 PROCEDURE — 85027 COMPLETE CBC AUTOMATED: CPT | Performed by: OBSTETRICS & GYNECOLOGY

## 2024-09-10 PROCEDURE — 86900 BLOOD TYPING SEROLOGIC ABO: CPT | Performed by: OBSTETRICS & GYNECOLOGY

## 2024-09-10 PROCEDURE — 36430 TRANSFUSION BLD/BLD COMPNT: CPT

## 2024-09-10 PROCEDURE — 86901 BLOOD TYPING SEROLOGIC RH(D): CPT | Performed by: OBSTETRICS & GYNECOLOGY

## 2024-09-10 PROCEDURE — 25810000003 LACTATED RINGERS SOLUTION: Performed by: OBSTETRICS & GYNECOLOGY

## 2024-09-10 PROCEDURE — 86850 RBC ANTIBODY SCREEN: CPT | Performed by: OBSTETRICS & GYNECOLOGY

## 2024-09-10 RX ORDER — ACETAMINOPHEN 325 MG/1
650 TABLET ORAL EVERY 6 HOURS
Status: DISCONTINUED | OUTPATIENT
Start: 2024-09-10 | End: 2024-09-12 | Stop reason: HOSPADM

## 2024-09-10 RX ORDER — DIPHENHYDRAMINE HYDROCHLORIDE 50 MG/ML
25 INJECTION INTRAMUSCULAR; INTRAVENOUS EVERY 4 HOURS PRN
Status: DISCONTINUED | OUTPATIENT
Start: 2024-09-10 | End: 2024-09-12 | Stop reason: HOSPADM

## 2024-09-10 RX ORDER — NALOXONE HCL 0.4 MG/ML
0.4 VIAL (ML) INJECTION
Status: ACTIVE | OUTPATIENT
Start: 2024-09-10 | End: 2024-09-11

## 2024-09-10 RX ORDER — DIPHENHYDRAMINE HCL 25 MG
25 CAPSULE ORAL EVERY 4 HOURS PRN
Status: DISCONTINUED | OUTPATIENT
Start: 2024-09-10 | End: 2024-09-12 | Stop reason: HOSPADM

## 2024-09-10 RX ADMIN — SODIUM CHLORIDE, POTASSIUM CHLORIDE, SODIUM LACTATE AND CALCIUM CHLORIDE 1000 ML: 600; 310; 30; 20 INJECTION, SOLUTION INTRAVENOUS at 02:25

## 2024-09-10 RX ADMIN — ACETAMINOPHEN 500 MG: 500 TABLET ORAL at 08:17

## 2024-09-10 RX ADMIN — FLUOXETINE HYDROCHLORIDE 20 MG: 20 CAPSULE ORAL at 08:17

## 2024-09-10 RX ADMIN — DOCUSATE SODIUM 100 MG: 100 CAPSULE, LIQUID FILLED ORAL at 08:17

## 2024-09-10 RX ADMIN — ACETAMINOPHEN 500 MG: 500 TABLET ORAL at 02:25

## 2024-09-10 RX ADMIN — IBUPROFEN 600 MG: 600 TABLET, FILM COATED ORAL at 23:59

## 2024-09-10 RX ADMIN — KETOROLAC TROMETHAMINE 15 MG: 15 INJECTION, SOLUTION INTRAMUSCULAR; INTRAVENOUS at 10:44

## 2024-09-10 RX ADMIN — IBUPROFEN 600 MG: 600 TABLET, FILM COATED ORAL at 16:45

## 2024-09-10 RX ADMIN — SIMETHICONE 80 MG: 80 TABLET, CHEWABLE ORAL at 20:46

## 2024-09-10 RX ADMIN — Medication: at 03:36

## 2024-09-10 RX ADMIN — ACETAMINOPHEN 650 MG: 325 TABLET ORAL at 13:57

## 2024-09-10 RX ADMIN — ACETAMINOPHEN 650 MG: 325 TABLET ORAL at 20:46

## 2024-09-10 RX ADMIN — BUSPIRONE HYDROCHLORIDE 5 MG: 5 TABLET ORAL at 08:17

## 2024-09-10 RX ADMIN — DOCUSATE SODIUM 100 MG: 100 CAPSULE, LIQUID FILLED ORAL at 20:46

## 2024-09-10 RX ADMIN — KETOROLAC TROMETHAMINE 15 MG: 15 INJECTION, SOLUTION INTRAMUSCULAR; INTRAVENOUS at 05:26

## 2024-09-10 RX ADMIN — BUSPIRONE HYDROCHLORIDE 5 MG: 5 TABLET ORAL at 20:46

## 2024-09-10 NOTE — PLAN OF CARE
Goal Outcome Evaluation:            VSS. Pt received 2 units of PRBC. Very drowsy throughout day; not able to tolerate activity. Good u/o.

## 2024-09-10 NOTE — PROGRESS NOTES
Called to see patient 2/2 increased bleeding with syncopal event.    Uterus did seem deviated to right.  Farah catheter inserted.  Small to moderate amount of blood noted within endometrium.  Attempted evacuation manually.  Patient given 1 dose of methergine and cytotec 800mcg CT.    Will continue to monitor I&O's and VB carefully    Mariza Byrnes MD  9/9/24  21:15

## 2024-09-10 NOTE — LACTATION NOTE
I was going to pay courtesy f/u visit to pt. Per floor RN, pt. Has increased bleeding. Floor RN will let lactation know if pt. Needs assistance with breastfeeding.

## 2024-09-10 NOTE — PROGRESS NOTES
[facility] [place]    9/10/2024    Name:Nadya Cisneros    MR#:[id number]     PROGRESS NOTE:  Post-Op Day 1 S/P    HD:1    Subjective   33 y.o. yo Female  s/p CS at 39w0d . Pain well controlled. She has been lightheaded and continues to feel out of sorts which she attributes to the effects of narcotics Tolerating regular diet, no flatus. Lochia normal. UO is OK      History of  section        Objective    Vitals  Temp:  Temp:  [97.4 °F (36.3 °C)-98.6 °F (37 °C)] 98.2 °F (36.8 °C)  Temp src: Oral  BP:  BP: (111-159)/(65-97) 123/72  Pulse:  Heart Rate:  [69-93] 93  RR:   Resp:  [16-18] 16    General Awake, alert, no distress  Abdomen Soft, nondistended, fundus firm, is below umbilicus, appropriately tender  Incision  Intact, no erythema or exudate  Extremities Calves NT bilaterally     I/O last 3 completed shifts:  In: 900 [I.V.:900]  Out: 1645 [Urine:1150; Blood:495]    LABS:   Lab Results   Component Value Date    WBC 19.63 (H) 2024    HGB 8.6 (L) 2024    HCT 25.5 (L) 2024    MCV 89.2 2024     2024       Infant: male       Assessment   1.  POD 1 Repeat . CBC pending    Plan: check labs and if CBC is stable attempt to increase ambulatory status and adjust narcotics dosing. However, should her H&H drop significantly further evaluation will be needed.       Active Problems:   None      Ed Garcia MD  9/10/2024 09:27 EDT    Addendum:  HGB < 6e  I do not think there is evidence suggestive of active bleeding but will need to transfuse 2U PRBC at this time and follow up post transfusion      Electronically signed by Ed Garcia MD, 09/10/24, 10:45 AM EDT.

## 2024-09-10 NOTE — PROGRESS NOTES
Progress Note  PM follow up:  Very drowsy  Tolerating regular diet  UO good  VSS  Receiving second unit PRBC  Abdomen is soft. Active BS. Incision free of induration erythema or drainage.  Appears stable at this time  Plan to recheck CBC post transfusion  Continue Farah until H&H has improved and able to ambulate without significant assistance      Electronically signed by Ed Garcia MD, 09/10/24, 5:08 PM EDT.

## 2024-09-10 NOTE — PLAN OF CARE
Problem: Skin Injury Risk Increased  Goal: Skin Health and Integrity  Outcome: Ongoing, Progressing     Problem: Adjustment to Role Transition (Postpartum  Delivery)  Goal: Successful Maternal Role Transition  Outcome: Ongoing, Progressing     Problem: Bleeding (Postpartum  Delivery)  Goal: Hemostasis  Outcome: Ongoing, Progressing     Problem: Infection (Postpartum  Delivery)  Goal: Absence of Infection Signs and Symptoms  Outcome: Ongoing, Progressing     Problem: Pain (Postpartum  Delivery)  Goal: Acceptable Pain Control  Outcome: Ongoing, Progressing  Intervention: Prevent or Manage Pain  Recent Flowsheet Documentation  Taken 9/10/2024 0526 by Sarina Reyes RN  Pain Management Interventions: around-the-clock dosing utilized  Taken 9/10/2024 0225 by Sarina Reyes, RN  Pain Management Interventions:   see MAR   quiet environment facilitated  Taken 2024 2359 by Sarina Reyes RN  Pain Management Interventions:   around-the-clock dosing utilized   see MAR  Taken 2024 2154 by Sarina Reyes, RN  Pain Management Interventions:   see MAR   quiet environment facilitated     Problem: Postoperative Nausea and Vomiting (Postpartum  Delivery)  Goal: Nausea and Vomiting Relief  Outcome: Ongoing, Progressing     Problem: Postoperative Urinary Retention (Postpartum  Delivery)  Goal: Effective Urinary Elimination  Outcome: Ongoing, Progressing   Goal Outcome Evaluation:       saw pt at 0 due to episode of fainting and increased bleeding before shift change. U/S of uterus and fundal/vaginal assessment was performed by  and . Farah catheter was placed and Cytotec was ordered to help with spurts of bleeding. Pt is improving and bleeding has significantly decreased. U/O is <30mLs per hour a 1000ml bolus of LR was ordered and administered at 0225 and O/U has improved slightly. Pt pain has improved as well.

## 2024-09-10 NOTE — ANESTHESIA POSTPROCEDURE EVALUATION
Patient: Nadya Cisneros    Procedure Summary       Date: 24 Room / Location: [facility] LABOR DELIVERY   [facility] DELIV[facility]    Anesthesia Start: 914 Anesthesia Stop:     Procedure:  SECTION REPEAT (Abdomen) Diagnosis:       History of  section      (History of  section [Z98.891])    Surgeons: Ed Garcia MD Provider: Camryn Wild DO    Anesthesia Type: spinal, ITN ASA Status: 2            Anesthesia Type: spinal, ITN    Vitals  Vitals Value Taken Time   /72 09/10/24 0740   Temp 98.2 °F (36.8 °C) 09/10/24 0740   Pulse 93 09/10/24 0740   Resp 16 09/10/24 0740   SpO2 97 % 24 1224   Vitals shown include unfiled device data.        Post Anesthesia Care and Evaluation    Patient location during evaluation: bedside  Patient participation: complete - patient participated  Level of consciousness: awake and alert  Pain management: adequate    Airway patency: patent  Anesthetic complications: No anesthetic complications    Cardiovascular status: acceptable  Respiratory status: acceptable  Hydration status: acceptable  Post Neuraxial Block status: Motor and sensory function returned to baseline and No signs or symptoms of PDPH

## 2024-09-10 NOTE — LACTATION NOTE
09/09/24 1810   Maternal Information   Date of Referral 09/09/24   Person Making Referral patient   Maternal Reason for Referral breastfeeding currently   Infant Reason for Referral other (see comments)  (down from NICU OBS)   Maternal Assessment   Breast Size Issue none   Breast Shape Bilateral:;round   Breast Density Bilateral:;soft   Areola Bilateral:;elastic   Nipples Bilateral:;everted   Left Nipple Symptoms intact;nontender   Right Nipple Symptoms intact;nontender   Maternal Infant Feeding   Maternal Emotional State relaxed;receptive   Infant Positioning clutch/football   Signs of Milk Transfer none noted   Pain with Feeding no   Latch Assistance minimal assistance   Support Person Involvement actively supporting mother     Pt. Called out for assistance with latching infant as he just came down from NICU OBS. When I entered room, MOM had infant latch in left cross cradle and was leaning down to infant. I repositioned to left football with pillows to elevate baby to level of Mom's breast. She independently latched baby. However, pt. Reports she felt a gush of blood. We called out to floor RN. I will f/u with pt. Next feeding to see how it's going and if needs latch assistance.

## 2024-09-11 LAB
BH BB BLOOD EXPIRATION DATE: NORMAL
BH BB BLOOD EXPIRATION DATE: NORMAL
BH BB BLOOD TYPE BARCODE: 5100
BH BB BLOOD TYPE BARCODE: 5100
BH BB DISPENSE STATUS: NORMAL
BH BB DISPENSE STATUS: NORMAL
BH BB PRODUCT CODE: NORMAL
BH BB PRODUCT CODE: NORMAL
BH BB UNIT NUMBER: NORMAL
BH BB UNIT NUMBER: NORMAL
CROSSMATCH INTERPRETATION: NORMAL
CROSSMATCH INTERPRETATION: NORMAL
DEPRECATED RDW RBC AUTO: 46.2 FL (ref 37–54)
DEPRECATED RDW RBC AUTO: 47.5 FL (ref 37–54)
ERYTHROCYTE [DISTWIDTH] IN BLOOD BY AUTOMATED COUNT: 14.4 % (ref 12.3–15.4)
ERYTHROCYTE [DISTWIDTH] IN BLOOD BY AUTOMATED COUNT: 14.9 % (ref 12.3–15.4)
HCT VFR BLD AUTO: 24.1 % (ref 34–46.6)
HCT VFR BLD AUTO: 24.9 % (ref 34–46.6)
HGB BLD-MCNC: 8.1 G/DL (ref 12–15.9)
HGB BLD-MCNC: 8.7 G/DL (ref 12–15.9)
MCH RBC QN AUTO: 30 PG (ref 26.6–33)
MCH RBC QN AUTO: 31 PG (ref 26.6–33)
MCHC RBC AUTO-ENTMCNC: 33.6 G/DL (ref 31.5–35.7)
MCHC RBC AUTO-ENTMCNC: 34.9 G/DL (ref 31.5–35.7)
MCV RBC AUTO: 88.6 FL (ref 79–97)
MCV RBC AUTO: 89.3 FL (ref 79–97)
PLATELET # BLD AUTO: 214 10*3/MM3 (ref 140–450)
PLATELET # BLD AUTO: 218 10*3/MM3 (ref 140–450)
PMV BLD AUTO: 10.4 FL (ref 6–12)
PMV BLD AUTO: 10.4 FL (ref 6–12)
RBC # BLD AUTO: 2.7 10*6/MM3 (ref 3.77–5.28)
RBC # BLD AUTO: 2.81 10*6/MM3 (ref 3.77–5.28)
UNIT  ABO: NORMAL
UNIT  ABO: NORMAL
UNIT  RH: NORMAL
UNIT  RH: NORMAL
WBC NRBC COR # BLD AUTO: 13.14 10*3/MM3 (ref 3.4–10.8)
WBC NRBC COR # BLD AUTO: 13.77 10*3/MM3 (ref 3.4–10.8)

## 2024-09-11 PROCEDURE — 85027 COMPLETE CBC AUTOMATED: CPT | Performed by: OBSTETRICS & GYNECOLOGY

## 2024-09-11 PROCEDURE — 0503F POSTPARTUM CARE VISIT: CPT | Performed by: OBSTETRICS & GYNECOLOGY

## 2024-09-11 RX ADMIN — DOCUSATE SODIUM 100 MG: 100 CAPSULE, LIQUID FILLED ORAL at 20:56

## 2024-09-11 RX ADMIN — ACETAMINOPHEN 650 MG: 325 TABLET ORAL at 08:40

## 2024-09-11 RX ADMIN — Medication 3 ML: at 08:41

## 2024-09-11 RX ADMIN — BUSPIRONE HYDROCHLORIDE 5 MG: 5 TABLET ORAL at 20:56

## 2024-09-11 RX ADMIN — DOCUSATE SODIUM 100 MG: 100 CAPSULE, LIQUID FILLED ORAL at 08:40

## 2024-09-11 RX ADMIN — IBUPROFEN 600 MG: 600 TABLET, FILM COATED ORAL at 22:42

## 2024-09-11 RX ADMIN — ACETAMINOPHEN 650 MG: 325 TABLET ORAL at 14:47

## 2024-09-11 RX ADMIN — ACETAMINOPHEN 650 MG: 325 TABLET ORAL at 20:55

## 2024-09-11 RX ADMIN — IBUPROFEN 600 MG: 600 TABLET, FILM COATED ORAL at 10:22

## 2024-09-11 RX ADMIN — BUSPIRONE HYDROCHLORIDE 5 MG: 5 TABLET ORAL at 08:40

## 2024-09-11 RX ADMIN — FLUOXETINE HYDROCHLORIDE 20 MG: 20 CAPSULE ORAL at 08:39

## 2024-09-11 RX ADMIN — ACETAMINOPHEN 650 MG: 325 TABLET ORAL at 02:57

## 2024-09-11 RX ADMIN — IBUPROFEN 600 MG: 600 TABLET, FILM COATED ORAL at 16:33

## 2024-09-11 RX ADMIN — IBUPROFEN 600 MG: 600 TABLET, FILM COATED ORAL at 05:56

## 2024-09-11 NOTE — PROGRESS NOTES
[facility] [place]    2024    Name:Nadya Cisneros    MR#:[id number]     PROGRESS NOTE:  Post-Op 2 S/P    HD:2    Subjective   33 y.o. yo Female  s/p CS at 39w0d doing well. Pain well controlled. Tolerating regular diet and having flatus. Lochia normal.       History of  section        Objective    Vitals  Temp:  Temp:  [97.8 °F (36.6 °C)-99 °F (37.2 °C)] 98 °F (36.7 °C)  Temp src: Oral  BP:  BP: (114-133)/(58-80) 122/69  Pulse:  Heart Rate:  [76-88] 80  RR:   Resp:  [16-18] 16    General Awake, alert, no distress  Abdomen Soft, nondistended, fundus firm, is below umbilicus, deviated to right, appropriately tender  Incision  Intact, no erythema or exudate  Extremities Calves NT bilaterally     I/O last 3 completed shifts:  In: 545 [Blood:545]  Out: 4225 [Urine:4225]    LABS:   Lab Results   Component Value Date    WBC 13.77 (H) 2024    HGB 8.7 (L) 2024    HCT 24.9 (L) 2024    MCV 88.6 2024     2024       Infant: male       Assessment   1.  POD 2 Repeat C Section Complicated by symptomatic anemia presumed due to surgical blood loss. S/P 2U PRBC with appropriate rise in H&H and now asymptomatic.   2. Mood addressed and patient voices no concerns at this time.     Plan: Doing well.  Will re-check H&H at noon. Otherwise, routine postoperative care      Active Problems:   None      Ed Garcia MD  2024 08:57 EDT

## 2024-09-11 NOTE — PLAN OF CARE
Problem: Adult Inpatient Plan of Care  Goal: Plan of Care Review  Outcome: Ongoing, Progressing  Goal: Patient-Specific Goal (Individualized)  Outcome: Ongoing, Progressing  Goal: Absence of Hospital-Acquired Illness or Injury  Outcome: Ongoing, Progressing  Intervention: Identify and Manage Fall Risk  Recent Flowsheet Documentation  Taken 9/11/2024 0620 by Sarina Reyes RN  Safety Promotion/Fall Prevention: safety round/check completed  Taken 9/11/2024 0556 by Sarina Reyes RN  Safety Promotion/Fall Prevention: safety round/check completed  Taken 9/11/2024 0330 by Sarina Reyes RN  Safety Promotion/Fall Prevention: safety round/check completed  Taken 9/11/2024 0257 by Sarina Reyes RN  Safety Promotion/Fall Prevention: safety round/check completed  Taken 9/11/2024 0000 by Sarina Reyes RN  Safety Promotion/Fall Prevention: safety round/check completed  Taken 9/10/2024 2359 by Sarina Reyes RN  Safety Promotion/Fall Prevention: safety round/check completed  Taken 9/10/2024 2230 by Sarina Reyes RN  Safety Promotion/Fall Prevention: safety round/check completed  Taken 9/10/2024 2200 by Sarina Reyes RN  Safety Promotion/Fall Prevention: safety round/check completed  Taken 9/10/2024 2046 by Sarina Reyes RN  Safety Promotion/Fall Prevention: safety round/check completed  Taken 9/10/2024 1930 by Sarina Reyes RN  Safety Promotion/Fall Prevention: safety round/check completed  Intervention: Prevent and Manage VTE (Venous Thromboembolism) Risk  Recent Flowsheet Documentation  Taken 9/11/2024 0620 by Sarina Reyes RN  Activity Management:   up ad matilda   ambulated to bathroom  Taken 9/11/2024 0315 by Sarina Reyes RN  Activity Management: up ad matilda  Taken 9/10/2024 2046 by Sarina Reyes RN  Activity Management: up ad matilda  Taken 9/10/2024 1930 by Sarina Reyes RN  Activity Management: ambulated in room  Goal: Optimal Comfort and Wellbeing  Outcome: Ongoing,  Progressing  Intervention: Monitor Pain and Promote Comfort  Recent Flowsheet Documentation  Taken 2024 0556 by Sarina Reyes RN  Pain Management Interventions: around-the-clock dosing utilized  Taken 2024 by Sarina Reyes RN  Pain Management Interventions: around-the-clock dosing utilized  Taken 9/10/2024 2359 by Sarina Reyes RN  Pain Management Interventions: around-the-clock dosing utilized  Taken 9/10/2024 2046 by Sarina Reyes RN  Pain Management Interventions: around-the-clock dosing utilized  Intervention: Provide Person-Centered Care  Recent Flowsheet Documentation  Taken 9/10/2024 2046 by Sarina Reyes RN  Trust Relationship/Rapport:   care explained   choices provided   emotional support provided  Goal: Readiness for Transition of Care  Outcome: Ongoing, Progressing     Problem: Skin Injury Risk Increased  Goal: Skin Health and Integrity  Outcome: Ongoing, Progressing  Intervention: Optimize Skin Protection  Recent Flowsheet Documentation  Taken 9/10/2024 2046 by Sarina Reyes RN  Head of Bed (HOB) Positioning: HOB elevated     Problem: Adjustment to Role Transition (Postpartum  Delivery)  Goal: Successful Maternal Role Transition  Outcome: Ongoing, Progressing     Problem: Bleeding (Postpartum  Delivery)  Goal: Hemostasis  Outcome: Ongoing, Progressing     Problem: Infection (Postpartum  Delivery)  Goal: Absence of Infection Signs and Symptoms  Outcome: Ongoing, Progressing     Problem: Pain (Postpartum  Delivery)  Goal: Acceptable Pain Control  Outcome: Ongoing, Progressing  Intervention: Prevent or Manage Pain  Recent Flowsheet Documentation  Taken 2024 05 by Sarina Reyes RN  Pain Management Interventions: around-the-clock dosing utilized  Taken 2024 by Sarina Reyes RN  Pain Management Interventions: around-the-clock dosing utilized  Taken 9/10/2024 2359 by Sarina Reyes, RN  Pain Management  Interventions: around-the-clock dosing utilized  Taken 9/10/2024 2046 by Sarina Reyes RN  Pain Management Interventions: around-the-clock dosing utilized     Problem: Postoperative Nausea and Vomiting (Postpartum  Delivery)  Goal: Nausea and Vomiting Relief  Outcome: Ongoing, Progressing     Problem: Postoperative Urinary Retention (Postpartum  Delivery)  Goal: Effective Urinary Elimination  Outcome: Ongoing, Progressing   Goal Outcome Evaluation:         VSS, pt pain has greatly improved. Mobility and function have also improved and pt verbalizes feeling a lot better than yesterday. Hemoglobin came back 8.7 and U/O has been sufficient, blanca catheter was discontinued this morning and pt has voided after blanca has been taken out.

## 2024-09-12 VITALS
OXYGEN SATURATION: 96 % | BODY MASS INDEX: 28.77 KG/M2 | TEMPERATURE: 97.8 F | SYSTOLIC BLOOD PRESSURE: 139 MMHG | HEIGHT: 64 IN | RESPIRATION RATE: 16 BRPM | DIASTOLIC BLOOD PRESSURE: 81 MMHG | WEIGHT: 168.5 LBS | HEART RATE: 72 BPM

## 2024-09-12 PROCEDURE — 0503F POSTPARTUM CARE VISIT: CPT | Performed by: OBSTETRICS & GYNECOLOGY

## 2024-09-12 RX ORDER — HYDROCODONE BITARTRATE AND ACETAMINOPHEN 5; 325 MG/1; MG/1
1-2 TABLET ORAL EVERY 6 HOURS PRN
Qty: 20 TABLET | Refills: 0 | Status: SHIPPED | OUTPATIENT
Start: 2024-09-12

## 2024-09-12 RX ORDER — IBUPROFEN 600 MG/1
600 TABLET, FILM COATED ORAL EVERY 6 HOURS PRN
Qty: 90 TABLET | Refills: 0 | Status: SHIPPED | OUTPATIENT
Start: 2024-09-12

## 2024-09-12 RX ORDER — PSEUDOEPHEDRINE HCL 30 MG
100 TABLET ORAL 2 TIMES DAILY PRN
Qty: 60 CAPSULE | Refills: 0 | Status: SHIPPED | OUTPATIENT
Start: 2024-09-12

## 2024-09-12 RX ORDER — FERROUS SULFATE 325(65) MG
325 TABLET ORAL
Qty: 100 TABLET | Refills: 1 | Status: SHIPPED | OUTPATIENT
Start: 2024-09-12

## 2024-09-12 RX ADMIN — ACETAMINOPHEN 650 MG: 325 TABLET ORAL at 02:39

## 2024-09-12 RX ADMIN — FLUOXETINE HYDROCHLORIDE 20 MG: 20 CAPSULE ORAL at 08:18

## 2024-09-12 RX ADMIN — IBUPROFEN 600 MG: 600 TABLET, FILM COATED ORAL at 11:05

## 2024-09-12 RX ADMIN — BUSPIRONE HYDROCHLORIDE 5 MG: 5 TABLET ORAL at 08:18

## 2024-09-12 RX ADMIN — ACETAMINOPHEN 650 MG: 325 TABLET ORAL at 08:18

## 2024-09-12 RX ADMIN — DOCUSATE SODIUM 100 MG: 100 CAPSULE, LIQUID FILLED ORAL at 08:18

## 2024-09-12 RX ADMIN — IBUPROFEN 600 MG: 600 TABLET, FILM COATED ORAL at 05:45

## 2024-09-12 RX ADMIN — ACETAMINOPHEN 650 MG: 325 TABLET ORAL at 13:54

## 2024-09-12 NOTE — DISCHARGE SUMMARY
Discharge Summary    Date of Admission: 2024  Date of Discharge:  2024      Patient: Nadya Cisneros      MR#:[id number]    Primary Surgeon/OB: Ed Garcia MD    Discharge Surgeon/OB: Ed Garcia MD    Presenting Problem/History of Present Illness  History of  section [Z98.891]       History of  section         Discharge Diagnosis:    section at 39w0d  Excessive surgical blood loss    Procedures:  , Low Transverse     2024    9:44 AM   Transfusion 2 units packed red cells     Feeding method: Breastfeeding Status: Yes    Rh Immune globulin given: not applicable    Rubella vaccine given: not applicable    Discharge Date: 2024; Discharge Time: 12:59 EDT        Hospital Course  Patient is a 33 y.o. female  at 39w0d status post  section with uneventful postoperative recovery.  The patient had significant postoperative anemia presumed to excessive surgical blood loss and required 2 units of packed red cells.  She responded well to this intervention.  Patient was advanced to regular diet on postoperative day#1.  On discharge, ambulating, tolerating a regular diet without any difficulties and her incision is dry, clean and intact.     Infant:   male  fetus 4371 g (9 lb 10.2 oz)  with Apgar scores of 8 , 8  at five minutes.    Circumcision: yes    Condition on Discharge:  Stable    Vital Signs  Temp:  [97.8 °F (36.6 °C)-98.4 °F (36.9 °C)] 97.8 °F (36.6 °C)  Heart Rate:  [72-92] 72  Resp:  [16] 16  BP: (123-139)/(67-81) 139/81    Lab Results   Component Value Date    WBC 13.14 (H) 2024    HGB 8.1 (L) 2024    HCT 24.1 (L) 2024    MCV 89.3 2024     2024       Discharge Disposition  Home or Self Care    Discharge Medications     Discharge Medications        New Medications        Instructions Start Date   docusate sodium 100 MG capsule   100 mg, Oral, 2 Times Daily PRN      ferrous sulfate 325 (65 FE) MG  tablet   325 mg, Oral, 3 Times Daily With Meals      HYDROcodone-acetaminophen 5-325 MG per tablet  Commonly known as: NORCO   1-2 tablets, Oral, Every 6 Hours PRN      ibuprofen 600 MG tablet  Commonly known as: ADVIL,MOTRIN   600 mg, Oral, Every 6 Hours PRN             Continue These Medications        Instructions Start Date   busPIRone 5 MG tablet  Commonly known as: BUSPAR       FLUoxetine 20 MG capsule  Commonly known as: PROzac       ONE A DAY PRENATAL PO   Oral      PROBIOTIC-10 PO   Oral             Stop These Medications      ondansetron ODT 4 MG disintegrating tablet  Commonly known as: ZOFRAN-ODT              Discharge Diet: Regular    Activity at Discharge: Routine discharge instructions given    Follow-up Appointments  She needs a 2-week incision check      Ed Garcia MD  09/12/24  12:58 EDT

## 2024-09-12 NOTE — PLAN OF CARE
Goal Outcome Evaluation:               VSS, fundus/lochia/perineum WDL, pain well controlled with Motrin and Tylenol. Patient complains of intermittent headache that resolves with medication-denies visual changes and RUQ pain. Pumping frequently, voiding without difficulty, ambulating in partida. Ready for D/C home today.

## 2024-09-13 NOTE — PAYOR COMM NOTE
"JosNadya tinajero (33 y.o. Female)     From:Milana Ocasio LPN, Utilization Review  Phone #[phone]  Fax #[phone]      Discharged 24 with Baby.          Date of Birth   1990    Social Security Number       Address   [address] [facility] [place] [address]    Home Phone   [phone]    MRN   [id number]       [employer]    Marital Status                               Admission Date   24    Admission Type   Elective    Admitting Provider   Ed Garcia MD    Attending Provider       Department, Room/Bed   [facility] MOTHER BABY 4A, N420/1       Discharge Date   2024    Discharge Disposition   Home or Self Care    Discharge Destination                                 Attending Provider: (none)   Allergies: Pregabalin, Oxycodone    Isolation: None   Infection: None   Code Status: Prior    Ht: 162.6 cm (64\")   Wt: 76.4 kg (168 lb 8 oz)    Admission Cmt: None   Principal Problem: History of  section [Z98.891]                   Active Insurance as of 2024       Primary Coverage       Payor Plan Insurance Group Employer/Plan Group    [employer] BLUE CROSS [employer] BLUE CROSS BLUE SHIELD PPO [id number]       Payor Plan Address Payor Plan Phone Number Payor Plan Fax Number Effective Dates    [address] [phone]  2021 - None Entered    [place] [address]         Subscriber Name Subscriber Birth Date Member ID       CLIFF SEGOVIA 1982 [id number]                     Emergency Contacts        (Rel.) Home Phone Work Phone Mobile Phone    CLIFF MICHAUD (Spouse) [phone] -- [phone]                 Operative/Procedure Notes (last 7 days)        Ed Garcia MD at 24 0937          [facility]  Nadya Segovia  : 1990  MRN: [id number]  CSN: [id number]    Operative Report    Pre-Operative Dx:   Intrauterine pregnancy at 39w0d weeks   Previous  section - declines "       Postoperative dx:    Same as above  Double-footling Breech     Type of Delivery:  Repeat  (LTCS) with two-Layer Closure       Surgeon: Ed Garcia MD    Assistant: Tammy Andres MD was responsible for performing the following activities: Retraction, Suction, Irrigation, Suturing, Closing, and Delivery of Fetus and their skilled assistance was necessary for the success of this case.         Anesthesia: Spinal        EBL: 800 mls.       Antibiotics: cefazolin 2 gms     Drains: Blanca     Findings:             Infant:Male         Apgar Scores:8/8/9         Weight:4371    Indication:                 This patient is a 33-year-old  3 para 1 admitted for elective repeat  section after an essentially benign prenatal course            Procedure Details:   After the appropriate time out, the patient was prepped and draped in the usual sterile fashion.  She had been placed in the dorsal supine left lateral tilt position.  A blanca catheter had been placed in the bladder for drainage during the procedure. A pfannenstiel skin incision was made excising the previous surgical scar, with a knife and carried down to the fascia.  The fascia was nicked with a scalpel and the incision was extended bilaterally with curved Townsend scissors. The superior aspect of the fascia was grasped with Kocher clamps x 2 and bluntly as well as sharply dissected away from the underlying rectus muscles.  A similar process was repeated inferiorly. The rectus muscles were  and the peritoneal cavity was entered sharply without complications. The bladder flap was created with Metzenbaum scissors and pickups with teeth.  The  retractor was placed and after checking for no entrapment, was retracted down.  A transverse uterine incision was made with the knife and extended bilaterally.  The infant was delivered from the double footling presentation.  The mouth and nose were bulb suctioned.  The cord  was doubly clamped and cut and the infant handed to the pediatric nurse in attendance.  Cord blood was obtained.  The placenta was manually extracted from the uterus.  The uterus was then elevated from the abdomen and wiped free of remaining membranes.  The uterine incision was closed with #1 chromic in a 2 layer running locking fashion.  The uterus had been returned to the abdomen.  The lateral gutters were wiped free of remaining clots.  The site of surgical incision was inspected and noted to be hemostatic. The  retractor was removed.  Interceed was placed over the uterine incision. The subfascial tissue was inspected and noted to be hemostatic.  The peritoneum was closed with #1 Chronic in a running continuous fashion. The fascia was closed with 0 Vicryl in a running non-locking fashion in two segments.  Subcutaneous tissue was inspected and noted to be hemostatic with minimal bovie electrocautery.  Ree's fascia was closed with 3-0 Plain in a running non-locking fashion.  The skin was approximated with Subcutaneous absorbable staples. Dressings were placed.  All instrument and sponge counts were correct at the end of the procedure. The patient tolerated the procedure well.    She was taken to postoperative recovery room in stable condition.          Complications:   None     Pathology: None     Disposition:   Mother to Mother Baby/Postpartum  in stable condition currently.   Baby to NICU OBS  in stable condition currently.     Ed Garcia MD   2024  10:40 EDT    Electronically signed by Ed Garcia MD at 24 1044          Discharge Summary        Ed Garcia MD at 24 1258          Discharge Summary    Date of Admission: 2024  Date of Discharge:  2024      Patient: Nadya Cisneros      MR#:[id number]    Primary Surgeon/OB: Ed Garcia MD    Discharge Surgeon/OB: Ed Garcia MD    Presenting Problem/History of Present  Illness  History of  section [Z98.891]       History of  section         Discharge Diagnosis:    section at 39w0d  Excessive surgical blood loss    Procedures:  , Low Transverse     2024    9:44 AM   Transfusion 2 units packed red cells     Feeding method: Breastfeeding Status: Yes    Rh Immune globulin given: not applicable    Rubella vaccine given: not applicable    Discharge Date: 2024; Discharge Time: 12:59 EDT        Hospital Course  Patient is a 33 y.o. female  at 39w0d status post  section with uneventful postoperative recovery.  The patient had significant postoperative anemia presumed to excessive surgical blood loss and required 2 units of packed red cells.  She responded well to this intervention.  Patient was advanced to regular diet on postoperative day#1.  On discharge, ambulating, tolerating a regular diet without any difficulties and her incision is dry, clean and intact.     Infant:   male  fetus 4371 g (9 lb 10.2 oz)  with Apgar scores of 8 , 8  at five minutes.    Circumcision: yes    Condition on Discharge:  Stable    Vital Signs  Temp:  [97.8 °F (36.6 °C)-98.4 °F (36.9 °C)] 97.8 °F (36.6 °C)  Heart Rate:  [72-92] 72  Resp:  [16] 16  BP: (123-139)/(67-81) 139/81    Lab Results   Component Value Date    WBC 13.14 (H) 2024    HGB 8.1 (L) 2024    HCT 24.1 (L) 2024    MCV 89.3 2024     2024       Discharge Disposition  Home or Self Care    Discharge Medications     Discharge Medications        New Medications        Instructions Start Date   docusate sodium 100 MG capsule   100 mg, Oral, 2 Times Daily PRN      ferrous sulfate 325 (65 FE) MG tablet   325 mg, Oral, 3 Times Daily With Meals      HYDROcodone-acetaminophen 5-325 MG per tablet  Commonly known as: NORCO   1-2 tablets, Oral, Every 6 Hours PRN      ibuprofen 600 MG tablet  Commonly known as: ADVIL,MOTRIN   600 mg, Oral, Every 6 Hours PRN              Continue These Medications        Instructions Start Date   busPIRone 5 MG tablet  Commonly known as: BUSPAR       FLUoxetine 20 MG capsule  Commonly known as: PROzac       ONE A DAY PRENATAL PO   Oral      PROBIOTIC-10 PO   Oral             Stop These Medications      ondansetron ODT 4 MG disintegrating tablet  Commonly known as: ZOFRAN-ODT              Discharge Diet: Regular    Activity at Discharge: Routine discharge instructions given    Follow-up Appointments  She needs a 2-week incision check      Ed Garcia MD  09/12/24  12:58 EDT        Electronically signed by Ed Garcia MD at 09/12/24 1300

## 2024-09-30 ENCOUNTER — TELEPHONE (OUTPATIENT)
Dept: OBSTETRICS AND GYNECOLOGY | Facility: CLINIC | Age: 34
End: 2024-09-30

## 2024-09-30 NOTE — TELEPHONE ENCOUNTER
Caller: Nadya Cisneros    Relationship: Self    Best call back number: [phone] (home)     What is the best time to reach you: ANYTIME    Do you know the name of the person who called: NO    What was the call regarding: SCHEDULING    Is it okay if the provider responds through MyChart: NO

## 2024-09-30 NOTE — TELEPHONE ENCOUNTER
Hub staff attempted to follow warm transfer process and was unsuccessful     Caller: Nadya Cisneros    Relationship to patient: Self    Best call back number: [phone]  CALL ANYTIME, IT IS OKAY TO LVM.    Patient is needing: PATIENT CALLED TO SCHEDULE POSTPARTUM, 2 WEEK INCISION CHECK. C SECTION PERFORMED 09/09/24. PATIENT IS REQUESTING TO SCHEDULE FOR 10/01/24 DUE TO BEING THE ONLY DAY AVAILABLE. HUB FIRST AVAILABLE IS 10/22/24 UNABLE TO WARM TRANSFER FOR SOONER APPT.

## 2024-10-09 ENCOUNTER — TELEPHONE (OUTPATIENT)
Dept: OBSTETRICS AND GYNECOLOGY | Facility: CLINIC | Age: 34
End: 2024-10-09
Payer: COMMERCIAL

## 2024-10-09 ENCOUNTER — TELEPHONE (OUTPATIENT)
Dept: OBSTETRICS AND GYNECOLOGY | Facility: CLINIC | Age: 34
End: 2024-10-09

## 2024-10-09 NOTE — TELEPHONE ENCOUNTER
Provider: MARIXA ALATORRE    Caller: RUSSELL JULIETTE    Phone Number: [phone]    Reason for Call: PT CANCELED POST PARTUM APPT FOR 10/10/24 DUE TO NO TRANSPORTATION/NO  - PT DID NOT WANT TO R/S SAID THAT SHE WILL COME TO THE 6 WEEK CHECK UP - HER INCISION LOOKS AND FEELS GREAT     PLEASE CALL THE PT IF THE APPT NEEDS TO BE R/S    THANK YOU!

## 2024-10-28 ENCOUNTER — POSTPARTUM VISIT (OUTPATIENT)
Dept: OBSTETRICS AND GYNECOLOGY | Facility: CLINIC | Age: 34
End: 2024-10-28
Payer: COMMERCIAL

## 2024-10-28 VITALS
BODY MASS INDEX: 22.53 KG/M2 | DIASTOLIC BLOOD PRESSURE: 62 MMHG | HEIGHT: 64 IN | SYSTOLIC BLOOD PRESSURE: 100 MMHG | WEIGHT: 132 LBS

## 2024-10-28 DIAGNOSIS — Z98.891 HISTORY OF CESAREAN SECTION: Primary | ICD-10-CM

## 2024-10-28 PROBLEM — O09.90 SUPERVISION OF HIGH RISK PREGNANCY, ANTEPARTUM: Status: RESOLVED | Noted: 2024-01-31 | Resolved: 2024-10-28

## 2024-10-28 PROBLEM — O09.292 HISTORY OF PRE-ECLAMPSIA IN PRIOR PREGNANCY, CURRENTLY PREGNANT IN SECOND TRIMESTER: Status: RESOLVED | Noted: 2024-05-01 | Resolved: 2024-10-28

## 2024-10-28 PROBLEM — Z3A.37 37 WEEKS GESTATION OF PREGNANCY: Status: RESOLVED | Noted: 2024-01-31 | Resolved: 2024-10-28

## 2024-10-28 PROCEDURE — 0503F POSTPARTUM CARE VISIT: CPT | Performed by: OBSTETRICS & GYNECOLOGY

## 2024-10-28 NOTE — PROGRESS NOTES
"Subjective   Chief Complaint   Patient presents with    Postpartum Care     6 weeks post op       Nadya Cisneros is a 34 y.o. year old  presenting to be seen for her postpartum visit.  She had a Repeat .  Her son is doing well.    Since delivery she has not been sexually active.  She does not have concerns about post-partum blues/depression.   [place] Score = 0  She is bottle feeding.  For ongoing contraception, her plans are condoms and vasectomy.    The following portions of the patient's history were reviewed and updated as appropriate:current medications and allergies    Social History    Tobacco Use      Smoking status: Never      Smokeless tobacco: Never      Review of Systems  Constitutional POS: nothing reported    NEG: anorexia or night sweats   Genitourinary POS: nothing reported    NEG: dysuria or hematuria      Gastointestinal POS: nothing reported    NEG: bloating, change in bowel habits, melena, or reflux symptoms   Breast POS: nothing reported    NEG: persistent breast lump, skin dimpling, or nipple discharge        Objective   /62   Ht 162.6 cm (64\")   Wt 59.9 kg (132 lb)   LMP 2023   Breastfeeding No   BMI 22.66 kg/m²     General:  well developed; well nourished  no acute distress  mentation appropriate   Abdomen: soft, non-tender; no masses  no umbilical or inguinal hernias are present  no hepato-splenomegaly  incision is clean, dry, intact, and without drainage   Pelvis: Clinical staff was present for exam  External genitalia:  normal appearance of the external genitalia including Bartholin's and [place]'s glands.  :  urethral meatus normal;  Vaginal:  normal pink mucosa without prolapse or lesions. discharge present -  yellow;  Cervix:  normal appearance.  Uterus:  normal size, shape and consistency.  Adnexa:  normal bimanual exam of the adnexa.          Assessment   Normal 6 week postpartum exam S/P Repeat      Plan   BC options " reviewed and compared today: condoms and vasectomy  The importance of keeping all planned follow-up and taking all medications as prescribed was emphasized.  Follow up for annual exam 1 year    No orders of the defined types were placed in this encounter.         This note was electronically signed.    Ed Garcia M.D.  October 28, 2024    Note: Dictated Utilizing Dragon Dictation

## (undated) DEVICE — GLV SURG BIOGEL LTX PF 7 1/2

## (undated) DEVICE — PK C/SECT 10

## (undated) DEVICE — SOL IRR H2O BTL 1000ML STRL

## (undated) DEVICE — PENCL SMOKE/EVAC MEGADYNE TELESCP 10FT

## (undated) DEVICE — PATIENT RETURN ELECTRODE, SINGLE-USE, CONTACT QUALITY MONITORING, ADULT, WITH 9FT CORD, FOR PATIENTS WEIGING OVER 33LBS. (15KG): Brand: MEGADYNE

## (undated) DEVICE — INTERDRY. MOISTURE WICKING FABRIC WITH ANTIMICROBIAL SILVER. 144 IN BY 10 IN: Brand: INTERDRY

## (undated) DEVICE — TRY SPINE BLCK WHITACRE 25G 3X5IN

## (undated) DEVICE — TRAP FLD MINIVAC MEGADYNE 100ML

## (undated) DEVICE — 4-PORT MANIFOLD: Brand: NEPTUNE 2

## (undated) DEVICE — LARGE, DISPOSABLE ALEXIS O C-SECTION PROTECTOR - RETRACTOR: Brand: ALEXIS ® O C-SECTION PROTECTOR - RETRACTOR

## (undated) DEVICE — STPLR SKIN SUBCUTICULAR INSORB 2030

## (undated) DEVICE — SUT GUT CHRM 1 CTX 36IN 905H

## (undated) DEVICE — CLTH CLENS READYCLEANSE PERI CARE PK/5

## (undated) DEVICE — COATED VICRYL  (POLYGLACTIN 910) SUTURE, VIOLET BRAIDED, STERILE, SYNTHETIC ABSORBABLE SUTURE: Brand: COATED VICRYL

## (undated) DEVICE — SOL IRR NACL 0.9PCT BT 1000ML

## (undated) DEVICE — APPL CHLORAPREP TINTED 26ML TEAL

## (undated) DEVICE — MAT PREVALON MOBL TRANSFR AIR WO/PAD 39X80IN

## (undated) DEVICE — SYS CLS SKIN PREMIERPRO EXOFINFUSION 22CM